# Patient Record
Sex: MALE | Race: WHITE | NOT HISPANIC OR LATINO
[De-identification: names, ages, dates, MRNs, and addresses within clinical notes are randomized per-mention and may not be internally consistent; named-entity substitution may affect disease eponyms.]

---

## 2023-01-01 ENCOUNTER — TRANSCRIPTION ENCOUNTER (OUTPATIENT)
Age: 83
End: 2023-01-01

## 2023-01-01 ENCOUNTER — NON-APPOINTMENT (OUTPATIENT)
Age: 83
End: 2023-01-01

## 2023-01-01 ENCOUNTER — APPOINTMENT (OUTPATIENT)
Dept: NEUROSURGERY | Facility: HOSPITAL | Age: 83
End: 2023-01-01

## 2023-01-01 ENCOUNTER — APPOINTMENT (OUTPATIENT)
Dept: SPINE | Facility: CLINIC | Age: 83
End: 2023-01-01
Payer: COMMERCIAL

## 2023-01-01 ENCOUNTER — INPATIENT (INPATIENT)
Facility: HOSPITAL | Age: 83
LOS: 5 days | Discharge: HOME HEALTH | DRG: 459 | End: 2023-04-02
Attending: NEUROLOGICAL SURGERY | Admitting: NEUROLOGICAL SURGERY
Payer: MEDICARE

## 2023-01-01 ENCOUNTER — APPOINTMENT (OUTPATIENT)
Dept: SPINE | Facility: CLINIC | Age: 83
End: 2023-01-01

## 2023-01-01 ENCOUNTER — APPOINTMENT (OUTPATIENT)
Dept: SPINE | Facility: HOSPITAL | Age: 83
End: 2023-01-01

## 2023-01-01 VITALS
RESPIRATION RATE: 17 BRPM | HEART RATE: 100 BPM | WEIGHT: 214 LBS | DIASTOLIC BLOOD PRESSURE: 68 MMHG | HEIGHT: 62 IN | TEMPERATURE: 97.5 F | BODY MASS INDEX: 39.38 KG/M2 | OXYGEN SATURATION: 99 % | SYSTOLIC BLOOD PRESSURE: 111 MMHG

## 2023-01-01 VITALS
TEMPERATURE: 98 F | DIASTOLIC BLOOD PRESSURE: 75 MMHG | RESPIRATION RATE: 18 BRPM | SYSTOLIC BLOOD PRESSURE: 169 MMHG | HEART RATE: 95 BPM | OXYGEN SATURATION: 98 %

## 2023-01-01 VITALS
SYSTOLIC BLOOD PRESSURE: 131 MMHG | OXYGEN SATURATION: 98 % | RESPIRATION RATE: 16 BRPM | HEART RATE: 79 BPM | DIASTOLIC BLOOD PRESSURE: 67 MMHG | TEMPERATURE: 98 F

## 2023-01-01 DIAGNOSIS — F41.9 ANXIETY DISORDER, UNSPECIFIED: ICD-10-CM

## 2023-01-01 DIAGNOSIS — E03.9 HYPOTHYROIDISM, UNSPECIFIED: ICD-10-CM

## 2023-01-01 DIAGNOSIS — M10.9 GOUT, UNSPECIFIED: ICD-10-CM

## 2023-01-01 DIAGNOSIS — Z98.890 OTHER SPECIFIED POSTPROCEDURAL STATES: Chronic | ICD-10-CM

## 2023-01-01 DIAGNOSIS — E22.2 SYNDROME OF INAPPROPRIATE SECRETION OF ANTIDIURETIC HORMONE: ICD-10-CM

## 2023-01-01 DIAGNOSIS — Z98.890 OTHER SPECIFIED POSTPROCEDURAL STATES: ICD-10-CM

## 2023-01-01 DIAGNOSIS — F32.A ANXIETY DISORDER, UNSPECIFIED: ICD-10-CM

## 2023-01-01 DIAGNOSIS — Z09 ENCOUNTER FOR FOLLOW-UP EXAMINATION AFTER COMPLETED TREATMENT FOR CONDITIONS OTHER THAN MALIGNANT NEOPLASM: ICD-10-CM

## 2023-01-01 DIAGNOSIS — M48.04 SPINAL STENOSIS, THORACIC REGION: ICD-10-CM

## 2023-01-01 DIAGNOSIS — E87.1 HYPO-OSMOLALITY AND HYPONATREMIA: ICD-10-CM

## 2023-01-01 DIAGNOSIS — Z86.39 PERSONAL HISTORY OF OTHER ENDOCRINE, NUTRITIONAL AND METABOLIC DISEASE: ICD-10-CM

## 2023-01-01 DIAGNOSIS — Z82.49 FAMILY HISTORY OF ISCHEMIC HEART DISEASE AND OTHER DISEASES OF THE CIRCULATORY SYSTEM: ICD-10-CM

## 2023-01-01 DIAGNOSIS — Z51.89 ENCOUNTER FOR OTHER SPECIFIED AFTERCARE: ICD-10-CM

## 2023-01-01 DIAGNOSIS — E78.5 HYPERLIPIDEMIA, UNSPECIFIED: ICD-10-CM

## 2023-01-01 DIAGNOSIS — I35.0 NONRHEUMATIC AORTIC (VALVE) STENOSIS: ICD-10-CM

## 2023-01-01 DIAGNOSIS — Z79.84 LONG TERM (CURRENT) USE OF ORAL HYPOGLYCEMIC DRUGS: ICD-10-CM

## 2023-01-01 DIAGNOSIS — E11.9 TYPE 2 DIABETES MELLITUS WITHOUT COMPLICATIONS: ICD-10-CM

## 2023-01-01 DIAGNOSIS — Z91.013 ALLERGY TO SEAFOOD: ICD-10-CM

## 2023-01-01 DIAGNOSIS — R33.9 RETENTION OF URINE, UNSPECIFIED: ICD-10-CM

## 2023-01-01 DIAGNOSIS — I50.33 ACUTE ON CHRONIC DIASTOLIC (CONGESTIVE) HEART FAILURE: ICD-10-CM

## 2023-01-01 DIAGNOSIS — Z88.8 ALLERGY STATUS TO OTHER DRUGS, MEDICAMENTS AND BIOLOGICAL SUBSTANCES: ICD-10-CM

## 2023-01-01 DIAGNOSIS — Z01.818 ENCOUNTER FOR OTHER PREPROCEDURAL EXAMINATION: ICD-10-CM

## 2023-01-01 DIAGNOSIS — M47.14 OTHER SPONDYLOSIS WITH MYELOPATHY, THORACIC REGION: ICD-10-CM

## 2023-01-01 DIAGNOSIS — I11.0 HYPERTENSIVE HEART DISEASE WITH HEART FAILURE: ICD-10-CM

## 2023-01-01 DIAGNOSIS — Z87.39 PERSONAL HISTORY OF OTHER DISEASES OF THE MUSCULOSKELETAL SYSTEM AND CONNECTIVE TISSUE: ICD-10-CM

## 2023-01-01 DIAGNOSIS — I34.0 NONRHEUMATIC MITRAL (VALVE) INSUFFICIENCY: ICD-10-CM

## 2023-01-01 DIAGNOSIS — R29.898 OTHER SYMPTOMS AND SIGNS INVOLVING THE MUSCULOSKELETAL SYSTEM: ICD-10-CM

## 2023-01-01 DIAGNOSIS — I10 ESSENTIAL (PRIMARY) HYPERTENSION: ICD-10-CM

## 2023-01-01 LAB
A1C WITH ESTIMATED AVERAGE GLUCOSE RESULT: 6 % — HIGH (ref 4–5.6)
A1C WITH ESTIMATED AVERAGE GLUCOSE RESULT: 6.3 % — HIGH (ref 4–5.6)
ALBUMIN SERPL ELPH-MCNC: 3.5 G/DL — SIGNIFICANT CHANGE UP (ref 3.3–5)
ALP SERPL-CCNC: 99 U/L — SIGNIFICANT CHANGE UP (ref 40–120)
ALT FLD-CCNC: 20 U/L — SIGNIFICANT CHANGE UP (ref 10–45)
ANION GAP SERPL CALC-SCNC: 10 MMOL/L — SIGNIFICANT CHANGE UP (ref 5–17)
ANION GAP SERPL CALC-SCNC: 10 MMOL/L — SIGNIFICANT CHANGE UP (ref 5–17)
ANION GAP SERPL CALC-SCNC: 12 MMOL/L — SIGNIFICANT CHANGE UP (ref 5–17)
ANION GAP SERPL CALC-SCNC: 13 MMOL/L — SIGNIFICANT CHANGE UP (ref 5–17)
ANION GAP SERPL CALC-SCNC: 15 MMOL/L — SIGNIFICANT CHANGE UP (ref 5–17)
ANION GAP SERPL CALC-SCNC: 8 MMOL/L — SIGNIFICANT CHANGE UP (ref 5–17)
APTT BLD: 29.8 SEC — SIGNIFICANT CHANGE UP (ref 27.5–35.5)
APTT BLD: 33.4 SEC — SIGNIFICANT CHANGE UP (ref 27.5–35.5)
AST SERPL-CCNC: 28 U/L — SIGNIFICANT CHANGE UP (ref 10–40)
BASOPHILS # BLD AUTO: 0.02 K/UL — SIGNIFICANT CHANGE UP (ref 0–0.2)
BASOPHILS # BLD AUTO: 0.05 K/UL — SIGNIFICANT CHANGE UP (ref 0–0.2)
BASOPHILS NFR BLD AUTO: 0.2 % — SIGNIFICANT CHANGE UP (ref 0–2)
BASOPHILS NFR BLD AUTO: 0.6 % — SIGNIFICANT CHANGE UP (ref 0–2)
BILIRUB SERPL-MCNC: 0.2 MG/DL — SIGNIFICANT CHANGE UP (ref 0.2–1.2)
BLD GP AB SCN SERPL QL: NEGATIVE — SIGNIFICANT CHANGE UP
BLD GP AB SCN SERPL QL: NEGATIVE — SIGNIFICANT CHANGE UP
BUN SERPL-MCNC: 21 MG/DL — SIGNIFICANT CHANGE UP (ref 7–23)
BUN SERPL-MCNC: 23 MG/DL — SIGNIFICANT CHANGE UP (ref 7–23)
BUN SERPL-MCNC: 25 MG/DL — HIGH (ref 7–23)
BUN SERPL-MCNC: 25 MG/DL — HIGH (ref 7–23)
BUN SERPL-MCNC: 27 MG/DL — HIGH (ref 7–23)
BUN SERPL-MCNC: 28 MG/DL — HIGH (ref 7–23)
CALCIUM SERPL-MCNC: 8.1 MG/DL — LOW (ref 8.4–10.5)
CALCIUM SERPL-MCNC: 8.2 MG/DL — LOW (ref 8.4–10.5)
CALCIUM SERPL-MCNC: 8.4 MG/DL — SIGNIFICANT CHANGE UP (ref 8.4–10.5)
CALCIUM SERPL-MCNC: 8.7 MG/DL — SIGNIFICANT CHANGE UP (ref 8.4–10.5)
CALCIUM SERPL-MCNC: 8.8 MG/DL — SIGNIFICANT CHANGE UP (ref 8.4–10.5)
CALCIUM SERPL-MCNC: 9.1 MG/DL — SIGNIFICANT CHANGE UP (ref 8.4–10.5)
CALCIUM SERPL-MCNC: 9.3 MG/DL — SIGNIFICANT CHANGE UP (ref 8.4–10.5)
CALCIUM SERPL-MCNC: 9.5 MG/DL — SIGNIFICANT CHANGE UP (ref 8.4–10.5)
CHLORIDE SERPL-SCNC: 102 MMOL/L — SIGNIFICANT CHANGE UP (ref 96–108)
CHLORIDE SERPL-SCNC: 102 MMOL/L — SIGNIFICANT CHANGE UP (ref 96–108)
CHLORIDE SERPL-SCNC: 104 MMOL/L — SIGNIFICANT CHANGE UP (ref 96–108)
CHLORIDE SERPL-SCNC: 104 MMOL/L — SIGNIFICANT CHANGE UP (ref 96–108)
CHLORIDE SERPL-SCNC: 105 MMOL/L — SIGNIFICANT CHANGE UP (ref 96–108)
CHLORIDE SERPL-SCNC: 105 MMOL/L — SIGNIFICANT CHANGE UP (ref 96–108)
CHLORIDE SERPL-SCNC: 106 MMOL/L — SIGNIFICANT CHANGE UP (ref 96–108)
CHLORIDE SERPL-SCNC: 97 MMOL/L — SIGNIFICANT CHANGE UP (ref 96–108)
CO2 SERPL-SCNC: 21 MMOL/L — LOW (ref 22–31)
CO2 SERPL-SCNC: 22 MMOL/L — SIGNIFICANT CHANGE UP (ref 22–31)
CO2 SERPL-SCNC: 23 MMOL/L — SIGNIFICANT CHANGE UP (ref 22–31)
CO2 SERPL-SCNC: 24 MMOL/L — SIGNIFICANT CHANGE UP (ref 22–31)
CO2 SERPL-SCNC: 26 MMOL/L — SIGNIFICANT CHANGE UP (ref 22–31)
CREAT SERPL-MCNC: 0.88 MG/DL — SIGNIFICANT CHANGE UP (ref 0.5–1.3)
CREAT SERPL-MCNC: 0.97 MG/DL — SIGNIFICANT CHANGE UP (ref 0.5–1.3)
CREAT SERPL-MCNC: 0.98 MG/DL — SIGNIFICANT CHANGE UP (ref 0.5–1.3)
CREAT SERPL-MCNC: 1.09 MG/DL — SIGNIFICANT CHANGE UP (ref 0.5–1.3)
CREAT SERPL-MCNC: 1.1 MG/DL — SIGNIFICANT CHANGE UP (ref 0.5–1.3)
CREAT SERPL-MCNC: 1.13 MG/DL — SIGNIFICANT CHANGE UP (ref 0.5–1.3)
CREAT SERPL-MCNC: 1.16 MG/DL — SIGNIFICANT CHANGE UP (ref 0.5–1.3)
CREAT SERPL-MCNC: 1.26 MG/DL — SIGNIFICANT CHANGE UP (ref 0.5–1.3)
EGFR: 57 ML/MIN/1.73M2 — LOW
EGFR: 62 ML/MIN/1.73M2 — SIGNIFICANT CHANGE UP
EGFR: 64 ML/MIN/1.73M2 — SIGNIFICANT CHANGE UP
EGFR: 67 ML/MIN/1.73M2 — SIGNIFICANT CHANGE UP
EGFR: 67 ML/MIN/1.73M2 — SIGNIFICANT CHANGE UP
EGFR: 77 ML/MIN/1.73M2 — SIGNIFICANT CHANGE UP
EGFR: 77 ML/MIN/1.73M2 — SIGNIFICANT CHANGE UP
EGFR: 85 ML/MIN/1.73M2 — SIGNIFICANT CHANGE UP
EOSINOPHIL # BLD AUTO: 0 K/UL — SIGNIFICANT CHANGE UP (ref 0–0.5)
EOSINOPHIL # BLD AUTO: 0.07 K/UL — SIGNIFICANT CHANGE UP (ref 0–0.5)
EOSINOPHIL NFR BLD AUTO: 0 % — SIGNIFICANT CHANGE UP (ref 0–6)
EOSINOPHIL NFR BLD AUTO: 0.8 % — SIGNIFICANT CHANGE UP (ref 0–6)
ESTIMATED AVERAGE GLUCOSE: 126 MG/DL — HIGH (ref 68–114)
ESTIMATED AVERAGE GLUCOSE: 134 MG/DL — HIGH (ref 68–114)
GLUCOSE BLDC GLUCOMTR-MCNC: 106 MG/DL — HIGH (ref 70–99)
GLUCOSE BLDC GLUCOMTR-MCNC: 115 MG/DL — HIGH (ref 70–99)
GLUCOSE BLDC GLUCOMTR-MCNC: 116 MG/DL — HIGH (ref 70–99)
GLUCOSE BLDC GLUCOMTR-MCNC: 120 MG/DL — HIGH (ref 70–99)
GLUCOSE BLDC GLUCOMTR-MCNC: 120 MG/DL — HIGH (ref 70–99)
GLUCOSE BLDC GLUCOMTR-MCNC: 125 MG/DL — HIGH (ref 70–99)
GLUCOSE BLDC GLUCOMTR-MCNC: 128 MG/DL — HIGH (ref 70–99)
GLUCOSE BLDC GLUCOMTR-MCNC: 128 MG/DL — HIGH (ref 70–99)
GLUCOSE BLDC GLUCOMTR-MCNC: 129 MG/DL — HIGH (ref 70–99)
GLUCOSE BLDC GLUCOMTR-MCNC: 148 MG/DL — HIGH (ref 70–99)
GLUCOSE BLDC GLUCOMTR-MCNC: 153 MG/DL — HIGH (ref 70–99)
GLUCOSE BLDC GLUCOMTR-MCNC: 160 MG/DL — HIGH (ref 70–99)
GLUCOSE BLDC GLUCOMTR-MCNC: 164 MG/DL — HIGH (ref 70–99)
GLUCOSE BLDC GLUCOMTR-MCNC: 166 MG/DL — HIGH (ref 70–99)
GLUCOSE BLDC GLUCOMTR-MCNC: 172 MG/DL — HIGH (ref 70–99)
GLUCOSE BLDC GLUCOMTR-MCNC: 173 MG/DL — HIGH (ref 70–99)
GLUCOSE BLDC GLUCOMTR-MCNC: 177 MG/DL — HIGH (ref 70–99)
GLUCOSE BLDC GLUCOMTR-MCNC: 182 MG/DL — HIGH (ref 70–99)
GLUCOSE BLDC GLUCOMTR-MCNC: 183 MG/DL — HIGH (ref 70–99)
GLUCOSE BLDC GLUCOMTR-MCNC: 197 MG/DL — HIGH (ref 70–99)
GLUCOSE BLDC GLUCOMTR-MCNC: 204 MG/DL — HIGH (ref 70–99)
GLUCOSE BLDC GLUCOMTR-MCNC: 216 MG/DL — HIGH (ref 70–99)
GLUCOSE BLDC GLUCOMTR-MCNC: 222 MG/DL — HIGH (ref 70–99)
GLUCOSE BLDC GLUCOMTR-MCNC: 270 MG/DL — HIGH (ref 70–99)
GLUCOSE SERPL-MCNC: 112 MG/DL — HIGH (ref 70–99)
GLUCOSE SERPL-MCNC: 114 MG/DL — HIGH (ref 70–99)
GLUCOSE SERPL-MCNC: 118 MG/DL — HIGH (ref 70–99)
GLUCOSE SERPL-MCNC: 125 MG/DL — HIGH (ref 70–99)
GLUCOSE SERPL-MCNC: 153 MG/DL — HIGH (ref 70–99)
GLUCOSE SERPL-MCNC: 182 MG/DL — HIGH (ref 70–99)
GLUCOSE SERPL-MCNC: 184 MG/DL — HIGH (ref 70–99)
GLUCOSE SERPL-MCNC: 194 MG/DL — HIGH (ref 70–99)
HCT VFR BLD CALC: 31.7 % — LOW (ref 39–50)
HCT VFR BLD CALC: 32.3 % — LOW (ref 39–50)
HCT VFR BLD CALC: 33.5 % — LOW (ref 39–50)
HCT VFR BLD CALC: 34.8 % — LOW (ref 39–50)
HCT VFR BLD CALC: 34.8 % — LOW (ref 39–50)
HCT VFR BLD CALC: 36.9 % — LOW (ref 39–50)
HCT VFR BLD CALC: 37 % — LOW (ref 39–50)
HCT VFR BLD CALC: 38.9 % — LOW (ref 39–50)
HGB BLD-MCNC: 10.2 G/DL — LOW (ref 13–17)
HGB BLD-MCNC: 10.5 G/DL — LOW (ref 13–17)
HGB BLD-MCNC: 10.5 G/DL — LOW (ref 13–17)
HGB BLD-MCNC: 11.1 G/DL — LOW (ref 13–17)
HGB BLD-MCNC: 11.5 G/DL — LOW (ref 13–17)
HGB BLD-MCNC: 11.5 G/DL — LOW (ref 13–17)
HGB BLD-MCNC: 11.9 G/DL — LOW (ref 13–17)
HGB BLD-MCNC: 12.4 G/DL — LOW (ref 13–17)
IMM GRANULOCYTES NFR BLD AUTO: 0.5 % — SIGNIFICANT CHANGE UP (ref 0–0.9)
IMM GRANULOCYTES NFR BLD AUTO: 0.9 % — SIGNIFICANT CHANGE UP (ref 0–0.9)
INR BLD: 1.02 — SIGNIFICANT CHANGE UP (ref 0.88–1.16)
INR BLD: 1.06 — SIGNIFICANT CHANGE UP (ref 0.88–1.16)
LYMPHOCYTES # BLD AUTO: 0.98 K/UL — LOW (ref 1–3.3)
LYMPHOCYTES # BLD AUTO: 1.6 K/UL — SIGNIFICANT CHANGE UP (ref 1–3.3)
LYMPHOCYTES # BLD AUTO: 18.4 % — SIGNIFICANT CHANGE UP (ref 13–44)
LYMPHOCYTES # BLD AUTO: 8.1 % — LOW (ref 13–44)
MAGNESIUM SERPL-MCNC: 1.8 MG/DL — SIGNIFICANT CHANGE UP (ref 1.6–2.6)
MAGNESIUM SERPL-MCNC: 2 MG/DL — SIGNIFICANT CHANGE UP (ref 1.6–2.6)
MAGNESIUM SERPL-MCNC: 2.1 MG/DL — SIGNIFICANT CHANGE UP (ref 1.6–2.6)
MAGNESIUM SERPL-MCNC: 2.3 MG/DL — SIGNIFICANT CHANGE UP (ref 1.6–2.6)
MAGNESIUM SERPL-MCNC: 2.4 MG/DL — SIGNIFICANT CHANGE UP (ref 1.6–2.6)
MCHC RBC-ENTMCNC: 28.6 PG — SIGNIFICANT CHANGE UP (ref 27–34)
MCHC RBC-ENTMCNC: 28.8 PG — SIGNIFICANT CHANGE UP (ref 27–34)
MCHC RBC-ENTMCNC: 29 PG — SIGNIFICANT CHANGE UP (ref 27–34)
MCHC RBC-ENTMCNC: 29.3 PG — SIGNIFICANT CHANGE UP (ref 27–34)
MCHC RBC-ENTMCNC: 29.3 PG — SIGNIFICANT CHANGE UP (ref 27–34)
MCHC RBC-ENTMCNC: 31.1 GM/DL — LOW (ref 32–36)
MCHC RBC-ENTMCNC: 31.3 GM/DL — LOW (ref 32–36)
MCHC RBC-ENTMCNC: 31.9 GM/DL — LOW (ref 32–36)
MCHC RBC-ENTMCNC: 31.9 GM/DL — LOW (ref 32–36)
MCHC RBC-ENTMCNC: 32.2 GM/DL — SIGNIFICANT CHANGE UP (ref 32–36)
MCHC RBC-ENTMCNC: 32.2 GM/DL — SIGNIFICANT CHANGE UP (ref 32–36)
MCHC RBC-ENTMCNC: 32.5 GM/DL — SIGNIFICANT CHANGE UP (ref 32–36)
MCHC RBC-ENTMCNC: 33 GM/DL — SIGNIFICANT CHANGE UP (ref 32–36)
MCV RBC AUTO: 88.8 FL — SIGNIFICANT CHANGE UP (ref 80–100)
MCV RBC AUTO: 89.8 FL — SIGNIFICANT CHANGE UP (ref 80–100)
MCV RBC AUTO: 90.1 FL — SIGNIFICANT CHANGE UP (ref 80–100)
MCV RBC AUTO: 90.2 FL — SIGNIFICANT CHANGE UP (ref 80–100)
MCV RBC AUTO: 90.9 FL — SIGNIFICANT CHANGE UP (ref 80–100)
MCV RBC AUTO: 91.1 FL — SIGNIFICANT CHANGE UP (ref 80–100)
MCV RBC AUTO: 92 FL — SIGNIFICANT CHANGE UP (ref 80–100)
MCV RBC AUTO: 92 FL — SIGNIFICANT CHANGE UP (ref 80–100)
MONOCYTES # BLD AUTO: 0.35 K/UL — SIGNIFICANT CHANGE UP (ref 0–0.9)
MONOCYTES # BLD AUTO: 0.88 K/UL — SIGNIFICANT CHANGE UP (ref 0–0.9)
MONOCYTES NFR BLD AUTO: 4 % — SIGNIFICANT CHANGE UP (ref 2–14)
MONOCYTES NFR BLD AUTO: 7.3 % — SIGNIFICANT CHANGE UP (ref 2–14)
NEUTROPHILS # BLD AUTO: 10.1 K/UL — HIGH (ref 1.8–7.4)
NEUTROPHILS # BLD AUTO: 6.54 K/UL — SIGNIFICANT CHANGE UP (ref 1.8–7.4)
NEUTROPHILS NFR BLD AUTO: 75.3 % — SIGNIFICANT CHANGE UP (ref 43–77)
NEUTROPHILS NFR BLD AUTO: 83.9 % — HIGH (ref 43–77)
NRBC # BLD: 0 /100 WBCS — SIGNIFICANT CHANGE UP (ref 0–0)
NT-PROBNP SERPL-SCNC: 356 PG/ML — HIGH (ref 0–300)
NT-PROBNP SERPL-SCNC: 599 PG/ML — HIGH (ref 0–300)
PHOSPHATE SERPL-MCNC: 2.8 MG/DL — SIGNIFICANT CHANGE UP (ref 2.5–4.5)
PHOSPHATE SERPL-MCNC: 3.1 MG/DL — SIGNIFICANT CHANGE UP (ref 2.5–4.5)
PHOSPHATE SERPL-MCNC: 3.1 MG/DL — SIGNIFICANT CHANGE UP (ref 2.5–4.5)
PHOSPHATE SERPL-MCNC: 3.3 MG/DL — SIGNIFICANT CHANGE UP (ref 2.5–4.5)
PHOSPHATE SERPL-MCNC: 3.4 MG/DL — SIGNIFICANT CHANGE UP (ref 2.5–4.5)
PHOSPHATE SERPL-MCNC: 3.7 MG/DL — SIGNIFICANT CHANGE UP (ref 2.5–4.5)
PHOSPHATE SERPL-MCNC: 4.3 MG/DL — SIGNIFICANT CHANGE UP (ref 2.5–4.5)
PLATELET # BLD AUTO: 400 K/UL — SIGNIFICANT CHANGE UP (ref 150–400)
PLATELET # BLD AUTO: 405 K/UL — HIGH (ref 150–400)
PLATELET # BLD AUTO: 426 K/UL — HIGH (ref 150–400)
PLATELET # BLD AUTO: 451 K/UL — HIGH (ref 150–400)
PLATELET # BLD AUTO: 456 K/UL — HIGH (ref 150–400)
PLATELET # BLD AUTO: 463 K/UL — HIGH (ref 150–400)
PLATELET # BLD AUTO: 463 K/UL — HIGH (ref 150–400)
PLATELET # BLD AUTO: 486 K/UL — HIGH (ref 150–400)
POTASSIUM SERPL-MCNC: 4 MMOL/L — SIGNIFICANT CHANGE UP (ref 3.5–5.3)
POTASSIUM SERPL-MCNC: 4 MMOL/L — SIGNIFICANT CHANGE UP (ref 3.5–5.3)
POTASSIUM SERPL-MCNC: 4.4 MMOL/L — SIGNIFICANT CHANGE UP (ref 3.5–5.3)
POTASSIUM SERPL-MCNC: 4.6 MMOL/L — SIGNIFICANT CHANGE UP (ref 3.5–5.3)
POTASSIUM SERPL-MCNC: 4.7 MMOL/L — SIGNIFICANT CHANGE UP (ref 3.5–5.3)
POTASSIUM SERPL-MCNC: 5.3 MMOL/L — SIGNIFICANT CHANGE UP (ref 3.5–5.3)
POTASSIUM SERPL-SCNC: 4 MMOL/L — SIGNIFICANT CHANGE UP (ref 3.5–5.3)
POTASSIUM SERPL-SCNC: 4 MMOL/L — SIGNIFICANT CHANGE UP (ref 3.5–5.3)
POTASSIUM SERPL-SCNC: 4.4 MMOL/L — SIGNIFICANT CHANGE UP (ref 3.5–5.3)
POTASSIUM SERPL-SCNC: 4.6 MMOL/L — SIGNIFICANT CHANGE UP (ref 3.5–5.3)
POTASSIUM SERPL-SCNC: 4.7 MMOL/L — SIGNIFICANT CHANGE UP (ref 3.5–5.3)
POTASSIUM SERPL-SCNC: 5.3 MMOL/L — SIGNIFICANT CHANGE UP (ref 3.5–5.3)
PROT SERPL-MCNC: 6.6 G/DL — SIGNIFICANT CHANGE UP (ref 6–8.3)
PROTHROM AB SERPL-ACNC: 12.2 SEC — SIGNIFICANT CHANGE UP (ref 10.5–13.4)
PROTHROM AB SERPL-ACNC: 12.6 SEC — SIGNIFICANT CHANGE UP (ref 10.5–13.4)
RBC # BLD: 3.52 M/UL — LOW (ref 4.2–5.8)
RBC # BLD: 3.58 M/UL — LOW (ref 4.2–5.8)
RBC # BLD: 3.64 M/UL — LOW (ref 4.2–5.8)
RBC # BLD: 3.83 M/UL — LOW (ref 4.2–5.8)
RBC # BLD: 3.92 M/UL — LOW (ref 4.2–5.8)
RBC # BLD: 4.02 M/UL — LOW (ref 4.2–5.8)
RBC # BLD: 4.11 M/UL — LOW (ref 4.2–5.8)
RBC # BLD: 4.27 M/UL — SIGNIFICANT CHANGE UP (ref 4.2–5.8)
RBC # FLD: 13.9 % — SIGNIFICANT CHANGE UP (ref 10.3–14.5)
RBC # FLD: 14.2 % — SIGNIFICANT CHANGE UP (ref 10.3–14.5)
RBC # FLD: 14.3 % — SIGNIFICANT CHANGE UP (ref 10.3–14.5)
RBC # FLD: 14.3 % — SIGNIFICANT CHANGE UP (ref 10.3–14.5)
RBC # FLD: 14.4 % — SIGNIFICANT CHANGE UP (ref 10.3–14.5)
RBC # FLD: 14.5 % — SIGNIFICANT CHANGE UP (ref 10.3–14.5)
RH IG SCN BLD-IMP: POSITIVE — SIGNIFICANT CHANGE UP
RH IG SCN BLD-IMP: POSITIVE — SIGNIFICANT CHANGE UP
SARS-COV-2 RNA SPEC QL NAA+PROBE: SIGNIFICANT CHANGE UP
SODIUM SERPL-SCNC: 133 MMOL/L — LOW (ref 135–145)
SODIUM SERPL-SCNC: 136 MMOL/L — SIGNIFICANT CHANGE UP (ref 135–145)
SODIUM SERPL-SCNC: 137 MMOL/L — SIGNIFICANT CHANGE UP (ref 135–145)
SODIUM SERPL-SCNC: 138 MMOL/L — SIGNIFICANT CHANGE UP (ref 135–145)
SODIUM SERPL-SCNC: 138 MMOL/L — SIGNIFICANT CHANGE UP (ref 135–145)
SODIUM SERPL-SCNC: 141 MMOL/L — SIGNIFICANT CHANGE UP (ref 135–145)
WBC # BLD: 12.04 K/UL — HIGH (ref 3.8–10.5)
WBC # BLD: 12.05 K/UL — HIGH (ref 3.8–10.5)
WBC # BLD: 12.11 K/UL — HIGH (ref 3.8–10.5)
WBC # BLD: 7.32 K/UL — SIGNIFICANT CHANGE UP (ref 3.8–10.5)
WBC # BLD: 7.86 K/UL — SIGNIFICANT CHANGE UP (ref 3.8–10.5)
WBC # BLD: 8.28 K/UL — SIGNIFICANT CHANGE UP (ref 3.8–10.5)
WBC # BLD: 8.69 K/UL — SIGNIFICANT CHANGE UP (ref 3.8–10.5)
WBC # BLD: 8.83 K/UL — SIGNIFICANT CHANGE UP (ref 3.8–10.5)
WBC # FLD AUTO: 12.04 K/UL — HIGH (ref 3.8–10.5)
WBC # FLD AUTO: 12.05 K/UL — HIGH (ref 3.8–10.5)
WBC # FLD AUTO: 12.11 K/UL — HIGH (ref 3.8–10.5)
WBC # FLD AUTO: 7.32 K/UL — SIGNIFICANT CHANGE UP (ref 3.8–10.5)
WBC # FLD AUTO: 7.86 K/UL — SIGNIFICANT CHANGE UP (ref 3.8–10.5)
WBC # FLD AUTO: 8.28 K/UL — SIGNIFICANT CHANGE UP (ref 3.8–10.5)
WBC # FLD AUTO: 8.69 K/UL — SIGNIFICANT CHANGE UP (ref 3.8–10.5)
WBC # FLD AUTO: 8.83 K/UL — SIGNIFICANT CHANGE UP (ref 3.8–10.5)

## 2023-01-01 PROCEDURE — 99232 SBSQ HOSP IP/OBS MODERATE 35: CPT | Mod: GC

## 2023-01-01 PROCEDURE — 86900 BLOOD TYPING SEROLOGIC ABO: CPT

## 2023-01-01 PROCEDURE — 97165 OT EVAL LOW COMPLEX 30 MIN: CPT

## 2023-01-01 PROCEDURE — 97530 THERAPEUTIC ACTIVITIES: CPT

## 2023-01-01 PROCEDURE — 82962 GLUCOSE BLOOD TEST: CPT

## 2023-01-01 PROCEDURE — 72070 X-RAY EXAM THORAC SPINE 2VWS: CPT | Mod: 26

## 2023-01-01 PROCEDURE — 83880 ASSAY OF NATRIURETIC PEPTIDE: CPT

## 2023-01-01 PROCEDURE — 22610 ARTHRD PST TQ 1NTRSPC THRC: CPT

## 2023-01-01 PROCEDURE — 85730 THROMBOPLASTIN TIME PARTIAL: CPT

## 2023-01-01 PROCEDURE — U0005: CPT

## 2023-01-01 PROCEDURE — 93970 EXTREMITY STUDY: CPT

## 2023-01-01 PROCEDURE — 99222 1ST HOSP IP/OBS MODERATE 55: CPT

## 2023-01-01 PROCEDURE — 99231 SBSQ HOSP IP/OBS SF/LOW 25: CPT

## 2023-01-01 PROCEDURE — 99024 POSTOP FOLLOW-UP VISIT: CPT

## 2023-01-01 PROCEDURE — 22842 INSERT SPINE FIXATION DEVICE: CPT

## 2023-01-01 PROCEDURE — 93306 TTE W/DOPPLER COMPLETE: CPT

## 2023-01-01 PROCEDURE — 80048 BASIC METABOLIC PNL TOTAL CA: CPT

## 2023-01-01 PROCEDURE — 85610 PROTHROMBIN TIME: CPT

## 2023-01-01 PROCEDURE — 97161 PT EVAL LOW COMPLEX 20 MIN: CPT

## 2023-01-01 PROCEDURE — 93970 EXTREMITY STUDY: CPT | Mod: 26

## 2023-01-01 PROCEDURE — 72070 X-RAY EXAM THORAC SPINE 2VWS: CPT

## 2023-01-01 PROCEDURE — 71045 X-RAY EXAM CHEST 1 VIEW: CPT | Mod: 26

## 2023-01-01 PROCEDURE — 86850 RBC ANTIBODY SCREEN: CPT

## 2023-01-01 PROCEDURE — 84100 ASSAY OF PHOSPHORUS: CPT

## 2023-01-01 PROCEDURE — 63048 LAM FACETEC &FORAMOT EA ADDL: CPT

## 2023-01-01 PROCEDURE — 85027 COMPLETE CBC AUTOMATED: CPT

## 2023-01-01 PROCEDURE — 76000 FLUOROSCOPY <1 HR PHYS/QHP: CPT

## 2023-01-01 PROCEDURE — 36415 COLL VENOUS BLD VENIPUNCTURE: CPT

## 2023-01-01 PROCEDURE — 93306 TTE W/DOPPLER COMPLETE: CPT | Mod: 26

## 2023-01-01 PROCEDURE — 71045 X-RAY EXAM CHEST 1 VIEW: CPT

## 2023-01-01 PROCEDURE — 83036 HEMOGLOBIN GLYCOSYLATED A1C: CPT

## 2023-01-01 PROCEDURE — 83735 ASSAY OF MAGNESIUM: CPT

## 2023-01-01 PROCEDURE — 99232 SBSQ HOSP IP/OBS MODERATE 35: CPT

## 2023-01-01 PROCEDURE — 97116 GAIT TRAINING THERAPY: CPT

## 2023-01-01 PROCEDURE — 63046 LAM FACETEC & FORAMOT THRC: CPT

## 2023-01-01 PROCEDURE — C1889: CPT

## 2023-01-01 PROCEDURE — 85025 COMPLETE CBC W/AUTO DIFF WBC: CPT

## 2023-01-01 PROCEDURE — 99233 SBSQ HOSP IP/OBS HIGH 50: CPT | Mod: GC

## 2023-01-01 PROCEDURE — C1713: CPT

## 2023-01-01 PROCEDURE — U0003: CPT

## 2023-01-01 PROCEDURE — 86901 BLOOD TYPING SEROLOGIC RH(D): CPT

## 2023-01-01 PROCEDURE — 80053 COMPREHEN METABOLIC PANEL: CPT

## 2023-01-01 PROCEDURE — 22614 ARTHRD PST TQ 1NTRSPC EA ADD: CPT

## 2023-01-01 PROCEDURE — 99233 SBSQ HOSP IP/OBS HIGH 50: CPT

## 2023-01-01 DEVICE — IMPLANTABLE DEVICE: Type: IMPLANTABLE DEVICE | Status: FUNCTIONAL

## 2023-01-01 DEVICE — SCREW EVEREST POLY 6.5X40MM: Type: IMPLANTABLE DEVICE | Status: FUNCTIONAL

## 2023-01-01 DEVICE — ROD CONTOURED 5.5X125MM: Type: IMPLANTABLE DEVICE | Status: FUNCTIONAL

## 2023-01-01 DEVICE — SURGIFLO HEMOSTATIC MATRIX KIT: Type: IMPLANTABLE DEVICE | Status: FUNCTIONAL

## 2023-01-01 DEVICE — SURGIFOAM PAD 8CM X 12.5CM X 10MM (100): Type: IMPLANTABLE DEVICE | Status: FUNCTIONAL

## 2023-01-01 DEVICE — SET SCREW EVEREST CUSTOM: Type: IMPLANTABLE DEVICE | Status: FUNCTIONAL

## 2023-01-01 RX ORDER — SODIUM CHLORIDE 9 MG/ML
1000 INJECTION INTRAMUSCULAR; INTRAVENOUS; SUBCUTANEOUS
Refills: 0 | Status: DISCONTINUED | OUTPATIENT
Start: 2023-01-01 | End: 2023-01-01

## 2023-01-01 RX ORDER — LEVOTHYROXINE SODIUM 25 UG/1
25 TABLET ORAL
Refills: 0 | Status: ACTIVE | COMMUNITY

## 2023-01-01 RX ORDER — EZETIMIBE 10 MG/1
1 TABLET ORAL
Refills: 0 | DISCHARGE

## 2023-01-01 RX ORDER — POVIDONE-IODINE 5 %
1 AEROSOL (ML) TOPICAL ONCE
Refills: 0 | Status: COMPLETED | OUTPATIENT
Start: 2023-01-01 | End: 2023-01-01

## 2023-01-01 RX ORDER — ONDANSETRON 8 MG/1
4 TABLET, FILM COATED ORAL EVERY 6 HOURS
Refills: 0 | Status: DISCONTINUED | OUTPATIENT
Start: 2023-01-01 | End: 2023-01-01

## 2023-01-01 RX ORDER — LOSARTAN POTASSIUM 50 MG/1
50 TABLET, FILM COATED ORAL
Refills: 0 | Status: ACTIVE | COMMUNITY

## 2023-01-01 RX ORDER — ALBUTEROL 90 UG/1
2 AEROSOL, METERED ORAL
Refills: 0 | DISCHARGE

## 2023-01-01 RX ORDER — ALLOPURINOL 300 MG/1
300 TABLET ORAL
Refills: 0 | Status: ACTIVE | COMMUNITY

## 2023-01-01 RX ORDER — GABAPENTIN 400 MG/1
1 CAPSULE ORAL
Refills: 0 | DISCHARGE

## 2023-01-01 RX ORDER — GABAPENTIN 400 MG/1
300 CAPSULE ORAL EVERY 8 HOURS
Refills: 0 | Status: DISCONTINUED | OUTPATIENT
Start: 2023-01-01 | End: 2023-01-01

## 2023-01-01 RX ORDER — CEFAZOLIN SODIUM 1 G
2000 VIAL (EA) INJECTION EVERY 8 HOURS
Refills: 0 | Status: COMPLETED | OUTPATIENT
Start: 2023-01-01 | End: 2023-01-01

## 2023-01-01 RX ORDER — ALLOPURINOL 300 MG
1 TABLET ORAL
Refills: 0 | DISCHARGE

## 2023-01-01 RX ORDER — SENNA PLUS 8.6 MG/1
2 TABLET ORAL
Qty: 14 | Refills: 0
Start: 2023-01-01 | End: 2023-01-01

## 2023-01-01 RX ORDER — INSULIN LISPRO 100/ML
VIAL (ML) SUBCUTANEOUS
Refills: 0 | Status: DISCONTINUED | OUTPATIENT
Start: 2023-01-01 | End: 2023-01-01

## 2023-01-01 RX ORDER — GABAPENTIN 400 MG/1
1 CAPSULE ORAL
Qty: 42 | Refills: 0
Start: 2023-01-01 | End: 2023-01-01

## 2023-01-01 RX ORDER — OXYCODONE HYDROCHLORIDE 5 MG/1
5 TABLET ORAL EVERY 4 HOURS
Refills: 0 | Status: DISCONTINUED | OUTPATIENT
Start: 2023-01-01 | End: 2023-01-01

## 2023-01-01 RX ORDER — AMLODIPINE BESYLATE 2.5 MG/1
10 TABLET ORAL DAILY
Refills: 0 | Status: DISCONTINUED | OUTPATIENT
Start: 2023-01-01 | End: 2023-01-01

## 2023-01-01 RX ORDER — ALBUTEROL 90 UG/1
2 AEROSOL, METERED ORAL EVERY 6 HOURS
Refills: 0 | Status: DISCONTINUED | OUTPATIENT
Start: 2023-01-01 | End: 2023-01-01

## 2023-01-01 RX ORDER — LACTULOSE 10 G/15ML
10 SOLUTION ORAL ONCE
Refills: 0 | Status: COMPLETED | OUTPATIENT
Start: 2023-01-01 | End: 2023-01-01

## 2023-01-01 RX ORDER — GLIPIZIDE 5 MG/1
5 TABLET ORAL
Refills: 0 | Status: ACTIVE | COMMUNITY

## 2023-01-01 RX ORDER — SENNA PLUS 8.6 MG/1
2 TABLET ORAL AT BEDTIME
Refills: 0 | Status: DISCONTINUED | OUTPATIENT
Start: 2023-01-01 | End: 2023-01-01

## 2023-01-01 RX ORDER — POLYETHYLENE GLYCOL 3350 17 G/17G
17 POWDER, FOR SOLUTION ORAL DAILY
Refills: 0 | Status: DISCONTINUED | OUTPATIENT
Start: 2023-01-01 | End: 2023-01-01

## 2023-01-01 RX ORDER — GABAPENTIN 400 MG/1
300 CAPSULE ORAL AT BEDTIME
Refills: 0 | Status: DISCONTINUED | OUTPATIENT
Start: 2023-01-01 | End: 2023-01-01

## 2023-01-01 RX ORDER — CHOLECALCIFEROL (VITAMIN D3) 125 MCG
1 CAPSULE ORAL
Refills: 0 | DISCHARGE

## 2023-01-01 RX ORDER — METHOCARBAMOL 500 MG/1
500 TABLET, FILM COATED ORAL EVERY 8 HOURS
Refills: 0 | Status: DISCONTINUED | OUTPATIENT
Start: 2023-01-01 | End: 2023-01-01

## 2023-01-01 RX ORDER — COLCHICINE 0.6 MG
1 TABLET ORAL
Refills: 0 | DISCHARGE

## 2023-01-01 RX ORDER — ATORVASTATIN CALCIUM 80 MG/1
80 TABLET, FILM COATED ORAL AT BEDTIME
Refills: 0 | Status: DISCONTINUED | OUTPATIENT
Start: 2023-01-01 | End: 2023-01-01

## 2023-01-01 RX ORDER — OXYCODONE HYDROCHLORIDE 5 MG/1
5 TABLET ORAL
Refills: 0 | Status: ACTIVE | COMMUNITY

## 2023-01-01 RX ORDER — PAROXETINE HYDROCHLORIDE 30 MG/1
30 TABLET, FILM COATED ORAL
Refills: 0 | Status: ACTIVE | COMMUNITY

## 2023-01-01 RX ORDER — LOSARTAN POTASSIUM 100 MG/1
50 TABLET, FILM COATED ORAL DAILY
Refills: 0 | Status: DISCONTINUED | OUTPATIENT
Start: 2023-01-01 | End: 2023-01-01

## 2023-01-01 RX ORDER — ACETAMINOPHEN 500 MG
650 TABLET ORAL EVERY 6 HOURS
Refills: 0 | Status: DISCONTINUED | OUTPATIENT
Start: 2023-01-01 | End: 2023-01-01

## 2023-01-01 RX ORDER — CHOLECALCIFEROL (VITAMIN D3) 50 MCG
50 MCG TABLET ORAL
Refills: 0 | Status: ACTIVE | COMMUNITY

## 2023-01-01 RX ORDER — ALBUTEROL SULFATE 90 UG/1
108 (90 BASE) AEROSOL, METERED RESPIRATORY (INHALATION)
Refills: 0 | Status: ACTIVE | COMMUNITY

## 2023-01-01 RX ORDER — CELECOXIB 200 MG/1
200 CAPSULE ORAL ONCE
Refills: 0 | Status: COMPLETED | OUTPATIENT
Start: 2023-01-01 | End: 2023-01-01

## 2023-01-01 RX ORDER — AMLODIPINE BESYLATE 10 MG/1
10 TABLET ORAL
Refills: 0 | Status: ACTIVE | COMMUNITY

## 2023-01-01 RX ORDER — DIPHENHYDRAMINE HCL 50 MG
25 CAPSULE ORAL ONCE
Refills: 0 | Status: COMPLETED | OUTPATIENT
Start: 2023-01-01 | End: 2023-01-01

## 2023-01-01 RX ORDER — TAMSULOSIN HYDROCHLORIDE 0.4 MG/1
0.4 CAPSULE ORAL AT BEDTIME
Refills: 0 | Status: DISCONTINUED | OUTPATIENT
Start: 2023-01-01 | End: 2023-01-01

## 2023-01-01 RX ORDER — OXYCODONE HYDROCHLORIDE 5 MG/1
1 TABLET ORAL
Qty: 18 | Refills: 0
Start: 2023-01-01 | End: 2023-01-01

## 2023-01-01 RX ORDER — CHLORHEXIDINE GLUCONATE 213 G/1000ML
1 SOLUTION TOPICAL EVERY 12 HOURS
Refills: 0 | Status: COMPLETED | OUTPATIENT
Start: 2023-01-01 | End: 2023-01-01

## 2023-01-01 RX ORDER — METFORMIN HYDROCHLORIDE 1000 MG/1
1000 TABLET, COATED ORAL
Refills: 0 | Status: ACTIVE | COMMUNITY

## 2023-01-01 RX ORDER — LOSARTAN POTASSIUM 100 MG/1
1 TABLET, FILM COATED ORAL
Refills: 0 | DISCHARGE

## 2023-01-01 RX ORDER — AMLODIPINE BESYLATE 2.5 MG/1
1 TABLET ORAL
Refills: 0 | DISCHARGE

## 2023-01-01 RX ORDER — FUROSEMIDE 40 MG
20 TABLET ORAL ONCE
Refills: 0 | Status: DISCONTINUED | OUTPATIENT
Start: 2023-01-01 | End: 2023-01-01

## 2023-01-01 RX ORDER — TAMSULOSIN HYDROCHLORIDE 0.4 MG/1
1 CAPSULE ORAL
Qty: 30 | Refills: 0
Start: 2023-01-01 | End: 2023-01-01

## 2023-01-01 RX ORDER — METHOCARBAMOL 500 MG/1
500 TABLET, FILM COATED ORAL
Refills: 0 | Status: ACTIVE | COMMUNITY

## 2023-01-01 RX ORDER — DIPHENHYDRAMINE HCL 50 MG
50 CAPSULE ORAL AT BEDTIME
Refills: 0 | Status: DISCONTINUED | OUTPATIENT
Start: 2023-01-01 | End: 2023-01-01

## 2023-01-01 RX ORDER — ENOXAPARIN SODIUM 100 MG/ML
40 INJECTION SUBCUTANEOUS EVERY 24 HOURS
Refills: 0 | Status: DISCONTINUED | OUTPATIENT
Start: 2023-01-01 | End: 2023-01-01

## 2023-01-01 RX ORDER — LEVOTHYROXINE SODIUM 125 MCG
1 TABLET ORAL
Refills: 0 | DISCHARGE

## 2023-01-01 RX ORDER — APREPITANT 80 MG/1
40 CAPSULE ORAL ONCE
Refills: 0 | Status: COMPLETED | OUTPATIENT
Start: 2023-01-01 | End: 2023-01-01

## 2023-01-01 RX ORDER — ACETAMINOPHEN 500 MG
1000 TABLET ORAL EVERY 8 HOURS
Refills: 0 | Status: DISCONTINUED | OUTPATIENT
Start: 2023-01-01 | End: 2023-01-01

## 2023-01-01 RX ORDER — MAGNESIUM SULFATE 500 MG/ML
2 VIAL (ML) INJECTION ONCE
Refills: 0 | Status: COMPLETED | OUTPATIENT
Start: 2023-01-01 | End: 2023-01-01

## 2023-01-01 RX ORDER — TAMSULOSIN HYDROCHLORIDE 0.4 MG/1
0.4 CAPSULE ORAL
Refills: 0 | Status: ACTIVE | COMMUNITY

## 2023-01-01 RX ORDER — METFORMIN HYDROCHLORIDE 850 MG/1
1 TABLET ORAL
Refills: 0 | DISCHARGE

## 2023-01-01 RX ORDER — EZETIMIBE 10 MG/1
10 TABLET ORAL
Refills: 0 | Status: ACTIVE | COMMUNITY

## 2023-01-01 RX ORDER — METHOCARBAMOL 500 MG/1
1 TABLET, FILM COATED ORAL
Qty: 15 | Refills: 0
Start: 2023-01-01 | End: 2023-01-01

## 2023-01-01 RX ORDER — COLCHICINE 0.6 MG/1
0.6 CAPSULE ORAL
Refills: 0 | Status: ACTIVE | COMMUNITY

## 2023-01-01 RX ORDER — GABAPENTIN 300 MG/1
300 CAPSULE ORAL
Refills: 0 | Status: ACTIVE | COMMUNITY

## 2023-01-01 RX ORDER — ACETAMINOPHEN 500 MG
1000 TABLET ORAL ONCE
Refills: 0 | Status: COMPLETED | OUTPATIENT
Start: 2023-01-01 | End: 2023-01-01

## 2023-01-01 RX ORDER — ATORVASTATIN CALCIUM 80 MG/1
1 TABLET, FILM COATED ORAL
Refills: 0 | DISCHARGE

## 2023-01-01 RX ADMIN — GABAPENTIN 300 MILLIGRAM(S): 400 CAPSULE ORAL at 22:31

## 2023-01-01 RX ADMIN — Medication 2: at 12:55

## 2023-01-01 RX ADMIN — SODIUM CHLORIDE 30 MILLILITER(S): 9 INJECTION INTRAMUSCULAR; INTRAVENOUS; SUBCUTANEOUS at 05:49

## 2023-01-01 RX ADMIN — ATORVASTATIN CALCIUM 80 MILLIGRAM(S): 80 TABLET, FILM COATED ORAL at 22:02

## 2023-01-01 RX ADMIN — ATORVASTATIN CALCIUM 80 MILLIGRAM(S): 80 TABLET, FILM COATED ORAL at 21:09

## 2023-01-01 RX ADMIN — METHOCARBAMOL 500 MILLIGRAM(S): 500 TABLET, FILM COATED ORAL at 23:59

## 2023-01-01 RX ADMIN — Medication 25 GRAM(S): at 10:12

## 2023-01-01 RX ADMIN — Medication 50 MILLIGRAM(S): at 22:34

## 2023-01-01 RX ADMIN — OXYCODONE HYDROCHLORIDE 5 MILLIGRAM(S): 5 TABLET ORAL at 06:18

## 2023-01-01 RX ADMIN — AMLODIPINE BESYLATE 10 MILLIGRAM(S): 2.5 TABLET ORAL at 05:47

## 2023-01-01 RX ADMIN — Medication 1000 MILLIGRAM(S): at 05:47

## 2023-01-01 RX ADMIN — SENNA PLUS 2 TABLET(S): 8.6 TABLET ORAL at 22:30

## 2023-01-01 RX ADMIN — Medication 650 MILLIGRAM(S): at 23:54

## 2023-01-01 RX ADMIN — GABAPENTIN 300 MILLIGRAM(S): 400 CAPSULE ORAL at 22:36

## 2023-01-01 RX ADMIN — LOSARTAN POTASSIUM 50 MILLIGRAM(S): 100 TABLET, FILM COATED ORAL at 14:47

## 2023-01-01 RX ADMIN — GABAPENTIN 300 MILLIGRAM(S): 400 CAPSULE ORAL at 05:48

## 2023-01-01 RX ADMIN — Medication 1 TABLET(S): at 12:38

## 2023-01-01 RX ADMIN — Medication 650 MILLIGRAM(S): at 22:08

## 2023-01-01 RX ADMIN — CHLORHEXIDINE GLUCONATE 1 APPLICATION(S): 213 SOLUTION TOPICAL at 09:10

## 2023-01-01 RX ADMIN — Medication 650 MILLIGRAM(S): at 02:01

## 2023-01-01 RX ADMIN — GABAPENTIN 300 MILLIGRAM(S): 400 CAPSULE ORAL at 23:59

## 2023-01-01 RX ADMIN — Medication 2: at 22:53

## 2023-01-01 RX ADMIN — GABAPENTIN 300 MILLIGRAM(S): 400 CAPSULE ORAL at 12:38

## 2023-01-01 RX ADMIN — METHOCARBAMOL 500 MILLIGRAM(S): 500 TABLET, FILM COATED ORAL at 22:02

## 2023-01-01 RX ADMIN — POLYETHYLENE GLYCOL 3350 17 GRAM(S): 17 POWDER, FOR SOLUTION ORAL at 12:02

## 2023-01-01 RX ADMIN — SENNA PLUS 2 TABLET(S): 8.6 TABLET ORAL at 22:35

## 2023-01-01 RX ADMIN — METHOCARBAMOL 500 MILLIGRAM(S): 500 TABLET, FILM COATED ORAL at 14:47

## 2023-01-01 RX ADMIN — Medication 650 MILLIGRAM(S): at 09:26

## 2023-01-01 RX ADMIN — Medication 6: at 22:43

## 2023-01-01 RX ADMIN — Medication 1000 MILLIGRAM(S): at 23:00

## 2023-01-01 RX ADMIN — AMLODIPINE BESYLATE 10 MILLIGRAM(S): 2.5 TABLET ORAL at 06:36

## 2023-01-01 RX ADMIN — LOSARTAN POTASSIUM 50 MILLIGRAM(S): 100 TABLET, FILM COATED ORAL at 06:18

## 2023-01-01 RX ADMIN — GABAPENTIN 300 MILLIGRAM(S): 400 CAPSULE ORAL at 22:01

## 2023-01-01 RX ADMIN — Medication 30 MILLIGRAM(S): at 12:56

## 2023-01-01 RX ADMIN — METHOCARBAMOL 500 MILLIGRAM(S): 500 TABLET, FILM COATED ORAL at 12:56

## 2023-01-01 RX ADMIN — METHOCARBAMOL 500 MILLIGRAM(S): 500 TABLET, FILM COATED ORAL at 21:09

## 2023-01-01 RX ADMIN — Medication 1000 MILLIGRAM(S): at 23:58

## 2023-01-01 RX ADMIN — SENNA PLUS 2 TABLET(S): 8.6 TABLET ORAL at 22:01

## 2023-01-01 RX ADMIN — Medication 2: at 12:02

## 2023-01-01 RX ADMIN — Medication 1000 MILLIGRAM(S): at 13:55

## 2023-01-01 RX ADMIN — Medication 2: at 17:39

## 2023-01-01 RX ADMIN — Medication 1000 MILLIGRAM(S): at 22:43

## 2023-01-01 RX ADMIN — ATORVASTATIN CALCIUM 80 MILLIGRAM(S): 80 TABLET, FILM COATED ORAL at 22:33

## 2023-01-01 RX ADMIN — METHOCARBAMOL 500 MILLIGRAM(S): 500 TABLET, FILM COATED ORAL at 06:18

## 2023-01-01 RX ADMIN — GABAPENTIN 300 MILLIGRAM(S): 400 CAPSULE ORAL at 14:47

## 2023-01-01 RX ADMIN — APREPITANT 40 MILLIGRAM(S): 80 CAPSULE ORAL at 12:00

## 2023-01-01 RX ADMIN — Medication 1000 MILLIGRAM(S): at 12:00

## 2023-01-01 RX ADMIN — Medication 1000 MILLIGRAM(S): at 15:24

## 2023-01-01 RX ADMIN — Medication 650 MILLIGRAM(S): at 21:08

## 2023-01-01 RX ADMIN — GABAPENTIN 300 MILLIGRAM(S): 400 CAPSULE ORAL at 21:09

## 2023-01-01 RX ADMIN — Medication 30 MILLIGRAM(S): at 13:58

## 2023-01-01 RX ADMIN — Medication 1 TABLET(S): at 12:19

## 2023-01-01 RX ADMIN — METHOCARBAMOL 500 MILLIGRAM(S): 500 TABLET, FILM COATED ORAL at 06:27

## 2023-01-01 RX ADMIN — LOSARTAN POTASSIUM 50 MILLIGRAM(S): 100 TABLET, FILM COATED ORAL at 05:24

## 2023-01-01 RX ADMIN — Medication 100 MILLIGRAM(S): at 22:02

## 2023-01-01 RX ADMIN — Medication 30 MILLIGRAM(S): at 12:02

## 2023-01-01 RX ADMIN — Medication 1 TABLET(S): at 12:02

## 2023-01-01 RX ADMIN — GABAPENTIN 300 MILLIGRAM(S): 400 CAPSULE ORAL at 05:25

## 2023-01-01 RX ADMIN — Medication 1000 MILLIGRAM(S): at 06:28

## 2023-01-01 RX ADMIN — ONDANSETRON 4 MILLIGRAM(S): 8 TABLET, FILM COATED ORAL at 06:25

## 2023-01-01 RX ADMIN — AMLODIPINE BESYLATE 10 MILLIGRAM(S): 2.5 TABLET ORAL at 05:25

## 2023-01-01 RX ADMIN — ATORVASTATIN CALCIUM 80 MILLIGRAM(S): 80 TABLET, FILM COATED ORAL at 23:58

## 2023-01-01 RX ADMIN — LOSARTAN POTASSIUM 50 MILLIGRAM(S): 100 TABLET, FILM COATED ORAL at 06:07

## 2023-01-01 RX ADMIN — AMLODIPINE BESYLATE 10 MILLIGRAM(S): 2.5 TABLET ORAL at 06:27

## 2023-01-01 RX ADMIN — Medication 1 TABLET(S): at 12:56

## 2023-01-01 RX ADMIN — AMLODIPINE BESYLATE 10 MILLIGRAM(S): 2.5 TABLET ORAL at 06:18

## 2023-01-01 RX ADMIN — GABAPENTIN 300 MILLIGRAM(S): 400 CAPSULE ORAL at 12:56

## 2023-01-01 RX ADMIN — Medication 50 MILLIGRAM(S): at 22:35

## 2023-01-01 RX ADMIN — Medication 100 MILLIGRAM(S): at 05:48

## 2023-01-01 RX ADMIN — Medication 4: at 06:10

## 2023-01-01 RX ADMIN — TAMSULOSIN HYDROCHLORIDE 0.4 MILLIGRAM(S): 0.4 CAPSULE ORAL at 22:33

## 2023-01-01 RX ADMIN — ONDANSETRON 4 MILLIGRAM(S): 8 TABLET, FILM COATED ORAL at 05:47

## 2023-01-01 RX ADMIN — POLYETHYLENE GLYCOL 3350 17 GRAM(S): 17 POWDER, FOR SOLUTION ORAL at 12:19

## 2023-01-01 RX ADMIN — ENOXAPARIN SODIUM 40 MILLIGRAM(S): 100 INJECTION SUBCUTANEOUS at 19:19

## 2023-01-01 RX ADMIN — LACTULOSE 10 GRAM(S): 10 SOLUTION ORAL at 06:30

## 2023-01-01 RX ADMIN — Medication 1000 MILLIGRAM(S): at 14:47

## 2023-01-01 RX ADMIN — ENOXAPARIN SODIUM 40 MILLIGRAM(S): 100 INJECTION SUBCUTANEOUS at 19:21

## 2023-01-01 RX ADMIN — Medication 1 TABLET(S): at 12:01

## 2023-01-01 RX ADMIN — LOSARTAN POTASSIUM 50 MILLIGRAM(S): 100 TABLET, FILM COATED ORAL at 06:26

## 2023-01-01 RX ADMIN — Medication 30 MILLIGRAM(S): at 20:49

## 2023-01-01 RX ADMIN — AMLODIPINE BESYLATE 10 MILLIGRAM(S): 2.5 TABLET ORAL at 06:07

## 2023-01-01 RX ADMIN — OXYCODONE HYDROCHLORIDE 5 MILLIGRAM(S): 5 TABLET ORAL at 07:18

## 2023-01-01 RX ADMIN — OXYCODONE HYDROCHLORIDE 5 MILLIGRAM(S): 5 TABLET ORAL at 09:50

## 2023-01-01 RX ADMIN — Medication 1000 MILLIGRAM(S): at 22:02

## 2023-01-01 RX ADMIN — GABAPENTIN 300 MILLIGRAM(S): 400 CAPSULE ORAL at 06:26

## 2023-01-01 RX ADMIN — Medication 650 MILLIGRAM(S): at 10:26

## 2023-01-01 RX ADMIN — METHOCARBAMOL 500 MILLIGRAM(S): 500 TABLET, FILM COATED ORAL at 05:47

## 2023-01-01 RX ADMIN — OXYCODONE HYDROCHLORIDE 5 MILLIGRAM(S): 5 TABLET ORAL at 10:50

## 2023-01-01 RX ADMIN — Medication 1000 MILLIGRAM(S): at 06:18

## 2023-01-01 RX ADMIN — METHOCARBAMOL 500 MILLIGRAM(S): 500 TABLET, FILM COATED ORAL at 14:24

## 2023-01-01 RX ADMIN — Medication 2: at 11:33

## 2023-01-01 RX ADMIN — GABAPENTIN 300 MILLIGRAM(S): 400 CAPSULE ORAL at 06:18

## 2023-01-01 RX ADMIN — OXYCODONE HYDROCHLORIDE 5 MILLIGRAM(S): 5 TABLET ORAL at 19:24

## 2023-01-01 RX ADMIN — POLYETHYLENE GLYCOL 3350 17 GRAM(S): 17 POWDER, FOR SOLUTION ORAL at 12:38

## 2023-01-01 RX ADMIN — Medication 25 MILLIGRAM(S): at 00:48

## 2023-01-01 RX ADMIN — ONDANSETRON 4 MILLIGRAM(S): 8 TABLET, FILM COATED ORAL at 23:44

## 2023-01-01 RX ADMIN — Medication 2: at 22:00

## 2023-01-01 RX ADMIN — Medication 1000 MILLIGRAM(S): at 22:30

## 2023-01-01 RX ADMIN — Medication 2: at 00:17

## 2023-01-01 RX ADMIN — CHLORHEXIDINE GLUCONATE 1 APPLICATION(S): 213 SOLUTION TOPICAL at 17:09

## 2023-01-01 RX ADMIN — Medication 1000 MILLIGRAM(S): at 22:58

## 2023-01-01 RX ADMIN — Medication 4: at 16:58

## 2023-01-01 RX ADMIN — Medication 650 MILLIGRAM(S): at 03:01

## 2023-01-01 RX ADMIN — Medication 1000 MILLIGRAM(S): at 12:55

## 2023-01-01 RX ADMIN — Medication 50 MILLIGRAM(S): at 22:30

## 2023-01-01 RX ADMIN — GABAPENTIN 300 MILLIGRAM(S): 400 CAPSULE ORAL at 14:24

## 2023-01-01 RX ADMIN — POLYETHYLENE GLYCOL 3350 17 GRAM(S): 17 POWDER, FOR SOLUTION ORAL at 12:55

## 2023-01-01 RX ADMIN — GABAPENTIN 300 MILLIGRAM(S): 400 CAPSULE ORAL at 06:35

## 2023-01-01 RX ADMIN — METHOCARBAMOL 500 MILLIGRAM(S): 500 TABLET, FILM COATED ORAL at 22:31

## 2023-01-01 RX ADMIN — Medication 1000 MILLIGRAM(S): at 06:24

## 2023-01-01 RX ADMIN — Medication 4: at 19:53

## 2023-01-01 RX ADMIN — METHOCARBAMOL 500 MILLIGRAM(S): 500 TABLET, FILM COATED ORAL at 23:54

## 2023-01-01 RX ADMIN — SODIUM CHLORIDE 30 MILLILITER(S): 9 INJECTION INTRAMUSCULAR; INTRAVENOUS; SUBCUTANEOUS at 00:00

## 2023-01-01 RX ADMIN — CELECOXIB 200 MILLIGRAM(S): 200 CAPSULE ORAL at 12:00

## 2023-01-01 RX ADMIN — GABAPENTIN 300 MILLIGRAM(S): 400 CAPSULE ORAL at 13:40

## 2023-01-01 RX ADMIN — Medication 1000 MILLIGRAM(S): at 14:24

## 2023-01-01 RX ADMIN — OXYCODONE HYDROCHLORIDE 5 MILLIGRAM(S): 5 TABLET ORAL at 20:24

## 2023-01-01 RX ADMIN — Medication 1000 MILLIGRAM(S): at 08:32

## 2023-01-01 RX ADMIN — Medication 1 APPLICATION(S): at 12:02

## 2023-01-01 RX ADMIN — ATORVASTATIN CALCIUM 80 MILLIGRAM(S): 80 TABLET, FILM COATED ORAL at 22:36

## 2023-01-01 RX ADMIN — Medication 650 MILLIGRAM(S): at 00:54

## 2023-01-01 RX ADMIN — Medication 30 MILLIGRAM(S): at 12:19

## 2023-03-27 NOTE — PATIENT PROFILE ADULT - FUNCTIONAL ASSESSMENT - BASIC MOBILITY 6.
2-calculated by average/Not able to assess (calculate score using [facility] averaging method)

## 2023-03-27 NOTE — PATIENT PROFILE ADULT - FALL HARM RISK - HARM RISK INTERVENTIONS
Assistance with ambulation/Assistance OOB with selected safe patient handling equipment/Communicate Risk of Fall with Harm to all staff/Discuss with provider need for PT consult/Monitor gait and stability/Reinforce activity limits and safety measures with patient and family/Tailored Fall Risk Interventions/Visual Cue: Yellow wristband and red socks/Bed in lowest position, wheels locked, appropriate side rails in place/Call bell, personal items and telephone in reach/Instruct patient to call for assistance before getting out of bed or chair/Non-slip footwear when patient is out of bed/[place] to call system/Physically safe environment - no spills, clutter or unnecessary equipment/Purposeful Proactive Rounding/Room/bathroom lighting operational, light cord in reach

## 2023-03-28 NOTE — H&P ADULT - HISTORY OF PRESENT ILLNESS
83 y/o male with h/o DM, HTN, HLD, Hypothyroidism, gout, and anxiety presents transferred with CT findings of multilevel thoracic canal stenosis in the setting of LE weakness and bowel/bladder symptoms. Presented to [place] on 2/25 after neurologist, Dr. Dailey recommended CT demonstrating concern for T10 compression. Presents with complaints of progressive worsening of b/l LE weakness and ambulatory disfunction x 3 months (previously ambulatory then requiring a cane, walker, and now unable to ambulate). Describes a tingling sensation in his toes but no loss of sensation in the lower extremities. Also reports dysuria, urinary retention, and saddle anesthesia over the last 2 weeks with intermittent bowel and bladder incontinence. Denies headache, chest pain, palpitations, visual abnormalities, nausea/vomiting, fever, chills, weight loss/gain. Transfered for further management and surgical plan.

## 2023-03-28 NOTE — H&P ADULT - NSHPSOCIALHISTORY_GEN_ALL_CORE
Patient lives with his wife at home in [place], [place]. Retired .  of [employer]. Denies tobacco, alcohol, ilicit drug use.

## 2023-03-28 NOTE — CONSULT NOTE ADULT - SUBJECTIVE AND OBJECTIVE BOX
NEUROSURGERY PAIN MANAGEMENT CONSULT NOTE    Chief Complaint: b/l lower extremity weakness    HPI:  83 y/o male with h/o DM, HTN, HLD, Hypothyroidism, gout, and anxiety presents transferred with CT findings of multilevel thoracic canal stenosis in the setting of LE weakness and bowel/bladder symptoms. Presented to [place] on 2/25 after neurologist, Dr. Dailey recommended CT demonstrating concern for T10 compression. Presents with complaints of progressive worsening of b/l LE weakness and ambulatory disfunction x 3 months (previously ambulatory then requiring a cane, walker, and now unable to ambulate). Describes a tingling sensation in his toes but no loss of sensation in the lower extremities. Also reports dysuria, urinary retention, and saddle anesthesia over the last 2 weeks with intermittent bowel and bladder incontinence. Denies headache, chest pain, palpitations, visual abnormalities, nausea/vomiting, fever, chills, weight loss/gain. Transfered for further management and surgical plan.  (28 Mar 2023 01:04)      PAST MEDICAL & SURGICAL HISTORY:  HTN (hypertension)      HLD (hyperlipidemia)      Gout      Anxiety      DM (diabetes mellitus)      Thoracic spinal stenosis      HTN (hypertension)      HTN (hypertension)      History of carpal tunnel surgery of left wrist      History of prostate surgery          FAMILY HISTORY:  Family history of stroke (Father)    Family history of heart attack (Mother)        SOCIAL HISTORY:  [ ] Denies Smoking, Alcohol, or Drug Use    HOME MEDICATIONS:   Please refer to initial HNP    PAIN HOME MEDICATIONS:    Allergies    niacin (Rash (Mild))  Shrimp (Anaphylaxis)    Intolerances        PAIN MEDICATIONS:  acetaminophen     Tablet .. 650 milliGRAM(s) Oral every 6 hours PRN  gabapentin 300 milliGRAM(s) Oral every 8 hours  methocarbamol 500 milliGRAM(s) Oral every 8 hours PRN  ondansetron Injectable 4 milliGRAM(s) IV Push every 6 hours PRN  PARoxetine 30 milliGRAM(s) Oral daily    Heme:    Antibiotics:    Cardiovascular:  amLODIPine   Tablet 10 milliGRAM(s) Oral daily  losartan 50 milliGRAM(s) Oral daily    GI:  polyethylene glycol 3350 17 Gram(s) Oral daily  senna 2 Tablet(s) Oral at bedtime    Endocrine:  atorvastatin 80 milliGRAM(s) Oral at bedtime  insulin lispro (ADMELOG) corrective regimen sliding scale   SubCutaneous Before meals and at bedtime    All Other Medications:  multivitamin 1 Tablet(s) Oral daily      Vital Signs Last 24 Hrs  T(C): 36.4 (28 Mar 2023 08:43), Max: 36.8 (27 Mar 2023 23:36)  T(F): 97.6 (28 Mar 2023 08:43), Max: 98.2 (27 Mar 2023 23:36)  HR: 69 (28 Mar 2023 08:43) (69 - 95)  BP: 136/72 (28 Mar 2023 08:43) (136/72 - 169/75)  BP(mean): 94 (28 Mar 2023 08:43) (94 - 94)  RR: 16 (28 Mar 2023 08:43) (16 - 18)  SpO2: 96% (28 Mar 2023 08:43) (96% - 99%)    Parameters below as of 28 Mar 2023 08:43  Patient On (Oxygen Delivery Method): room air        LABS:                        11.9   7.86  )-----------( 486      ( 28 Mar 2023 05:30 )             36.9     03-28    138  |  102  |  23  ----------------------------<  114<H>  4.0   |  24  |  1.09    Ca    9.3      28 Mar 2023 05:30  Phos  3.3     03-28  Mg     2.3     03-28    TPro  6.6  /  Alb  3.5  /  TBili  0.2  /  DBili  x   /  AST  28  /  ALT  20  /  AlkPhos  99  03-28    PT/INR - ( 28 Mar 2023 01:05 )   PT: 12.6 sec;   INR: 1.06          PTT - ( 28 Mar 2023 01:05 )  PTT:29.8 sec      RADIOLOGY:    Drug Screen:        REVIEW OF SYSTEMS:  CONSTITUTIONAL: No fever or fatigue O/N.   EYES: No eye pain, visual disturbances  ENMT:  No difficulty hearing. No throat pain  NECK: No pain or stiffness  RESPIRATORY: No cough, wheezing; No shortness of breath  CARDIOVASCULAR: No chest pain, palpitations.   GASTROINTESTINAL: Pt reports passing gas. No bowel movements. No abdominal or epigastric pain. No nausea, vomiting. GENITOURINARY: No dysuria, frequency, or incontinence  NEUROLOGICAL: No headaches, loss of strength, numbness, or tremors. No dizzinesss or lightheadedness with pain medications.   MUSCULOSKELETAL: Incisional back pain. No joint pain or swelling; No muscle, or extremity pain    PAIN ASSESSMENT: c/o right hip/low back pain radiating to R groin     PHYSICAL EXAM  GENERAL: Laying in bed, NAD  Neuro: CN II-XII PERRRL, EOMI  Cranial nerves grossly intact  Motor exam: MAEx4  Sensation intact to LT in UE/LE in 3 dermatomes  CHEST/LUNG: Clear to auscultation bilaterally; No rales, rhonchi, wheezing, or rubs  HEART: Regular rate and rhythm; No murmurs, rubs, or gallops  ABDOMEN: Soft, Nontender, Nondistended; Bowel sounds present  EXTREMITIES:  2+ Peripheral Pulses, No clubbing, cyanosis, or edema  SKIN: No rashes or lesions      ASSESSMENT:   83 y/o male with h/o DM, HTN, HLD, gout, and anxiety presents transfered with CT findings of multilevel thoracic canal stensis in the setting of LE weakness and bowel/bladder symptoms. Transfered for further management and surgical plan.     PLAN:   - Pain:      - Home pain regiment: Gabapentin 300mg at bedtime, Tylenol as needed    - Bowel regimen: Senna    - Nausea ppx: Zofran standing  - Functional Goals: Pt will get OOB with PT today. Pt will resume previous level of activity without impairment from surgery.   - Additional Consults: None recommended.   - Additional Labs/Imaging:  None recommended.   - Follow up, Discharge Planning: pending post op PT/OT  - Pain Management follow up plan: will continue to follow    D/w Dr. Yang   NEUROSURGERY PAIN MANAGEMENT CONSULT NOTE    Chief Complaint: b/l lower extremity weakness    HPI:  81 y/o male with h/o DM, HTN, HLD, Hypothyroidism, gout, and anxiety presents transferred with CT findings of multilevel thoracic canal stenosis in the setting of LE weakness and bowel/bladder symptoms. Presented to [place] on 2/25 after neurologist, Dr. Dailey recommended CT demonstrating concern for T10 compression. Presents with complaints of progressive worsening of b/l LE weakness and ambulatory disfunction x 3 months (previously ambulatory then requiring a cane, walker, and now unable to ambulate). Describes a tingling sensation in his toes but no loss of sensation in the lower extremities. Also reports dysuria, urinary retention, and saddle anesthesia over the last 2 weeks with intermittent bowel and bladder incontinence. Denies headache, chest pain, palpitations, visual abnormalities, nausea/vomiting, fever, chills, weight loss/gain. Transfered for further management and surgical plan.  (28 Mar 2023 01:04)      PAST MEDICAL & SURGICAL HISTORY:  HTN (hypertension)      HLD (hyperlipidemia)      Gout      Anxiety      DM (diabetes mellitus)      Thoracic spinal stenosis      HTN (hypertension)      HTN (hypertension)      History of carpal tunnel surgery of left wrist      History of prostate surgery          FAMILY HISTORY:  Family history of stroke (Father)    Family history of heart attack (Mother)        SOCIAL HISTORY:  [ ] Denies Smoking, Alcohol, or Drug Use    HOME MEDICATIONS:   Please refer to initial HNP    PAIN HOME MEDICATIONS:    Allergies    niacin (Rash (Mild))  Shrimp (Anaphylaxis)    Intolerances        PAIN MEDICATIONS:  acetaminophen     Tablet .. 650 milliGRAM(s) Oral every 6 hours PRN  gabapentin 300 milliGRAM(s) Oral every 8 hours  methocarbamol 500 milliGRAM(s) Oral every 8 hours PRN  ondansetron Injectable 4 milliGRAM(s) IV Push every 6 hours PRN  PARoxetine 30 milliGRAM(s) Oral daily    Heme:    Antibiotics:    Cardiovascular:  amLODIPine   Tablet 10 milliGRAM(s) Oral daily  losartan 50 milliGRAM(s) Oral daily    GI:  polyethylene glycol 3350 17 Gram(s) Oral daily  senna 2 Tablet(s) Oral at bedtime    Endocrine:  atorvastatin 80 milliGRAM(s) Oral at bedtime  insulin lispro (ADMELOG) corrective regimen sliding scale   SubCutaneous Before meals and at bedtime    All Other Medications:  multivitamin 1 Tablet(s) Oral daily      Vital Signs Last 24 Hrs  T(C): 36.4 (28 Mar 2023 08:43), Max: 36.8 (27 Mar 2023 23:36)  T(F): 97.6 (28 Mar 2023 08:43), Max: 98.2 (27 Mar 2023 23:36)  HR: 69 (28 Mar 2023 08:43) (69 - 95)  BP: 136/72 (28 Mar 2023 08:43) (136/72 - 169/75)  BP(mean): 94 (28 Mar 2023 08:43) (94 - 94)  RR: 16 (28 Mar 2023 08:43) (16 - 18)  SpO2: 96% (28 Mar 2023 08:43) (96% - 99%)    Parameters below as of 28 Mar 2023 08:43  Patient On (Oxygen Delivery Method): room air        LABS:                        11.9   7.86  )-----------( 486      ( 28 Mar 2023 05:30 )             36.9     03-28    138  |  102  |  23  ----------------------------<  114<H>  4.0   |  24  |  1.09    Ca    9.3      28 Mar 2023 05:30  Phos  3.3     03-28  Mg     2.3     03-28    TPro  6.6  /  Alb  3.5  /  TBili  0.2  /  DBili  x   /  AST  28  /  ALT  20  /  AlkPhos  99  03-28    PT/INR - ( 28 Mar 2023 01:05 )   PT: 12.6 sec;   INR: 1.06          PTT - ( 28 Mar 2023 01:05 )  PTT:29.8 sec      RADIOLOGY:    Drug Screen:        REVIEW OF SYSTEMS:  CONSTITUTIONAL: No fever or fatigue O/N.   EYES: No eye pain, visual disturbances  ENMT:  No difficulty hearing. No throat pain  NECK: No pain or stiffness  RESPIRATORY: No cough, wheezing; No shortness of breath  CARDIOVASCULAR: No chest pain, palpitations.   GASTROINTESTINAL: Pt reports passing gas. No bowel movements. No abdominal or epigastric pain. No nausea, vomiting. GENITOURINARY: No dysuria, frequency, or incontinence  NEUROLOGICAL: No headaches, loss of strength, numbness, or tremors. No dizzinesss or lightheadedness with pain medications.   MUSCULOSKELETAL: Incisional back pain. No joint pain or swelling; No muscle, or extremity pain    PAIN ASSESSMENT: c/o right hip/low back pain radiating to R groin     PHYSICAL EXAM  GENERAL: Laying in bed, NAD  Neuro: CN II-XII PERRL, EOMI  Cranial nerves grossly intact  Motor exam: MAEx4  Sensation intact to LT in UE/LE in 3 dermatomes  CHEST/LUNG: Clear to auscultation bilaterally; No rales, rhonchi, wheezing, or rubs  HEART: Regular rate and rhythm; No murmurs, rubs, or gallops  ABDOMEN: Soft, Nontender, Nondistended; Bowel sounds present  EXTREMITIES:  2+ Peripheral Pulses, No clubbing, cyanosis, or edema  SKIN: No rashes or lesions      ASSESSMENT:   81 y/o male with h/o DM, HTN, HLD, gout, and anxiety presents transfered with CT findings of multilevel thoracic canal stensis in the setting of LE weakness and bowel/bladder symptoms. Transfered for further management and surgical plan.     PLAN:   - Pain:  Increase home Gabapentin dose to 300mg TID  Tylenol 650mg PRN for mild to moderate pain     - Home pain regiment: Gabapentin 300mg at bedtime, Tylenol as needed    - Bowel regimen: Senna    - Nausea ppx: Zofran standing  - Functional Goals: Pt will get OOB with PT today. Pt will resume previous level of activity without impairment from surgery.   - Additional Consults: None recommended.   - Additional Labs/Imaging:  None recommended.   - Follow up, Discharge Planning: pending post op PT/OT  - Pain Management follow up plan: will continue to follow    D/w Dr. Yang   Attending Attestation (For Attendings USE Only)...

## 2023-03-28 NOTE — H&P ADULT - NSHPLABSRESULTS_GEN_ALL_CORE
11.5   8.83  )-----------( 451      ( 28 Mar 2023 01:05 )             34.8   03-28    137  |  104  |  23  ----------------------------<  153<H>  4.0   |  23  |  1.16    Ca    8.8      28 Mar 2023 01:05  Phos  3.1     03-28  Mg     2.0     03-28    PT/INR - ( 28 Mar 2023 01:05 )   PT: 12.6 sec;   INR: 1.06        PTT - ( 28 Mar 2023 01:05 )  PTT:29.8 sec

## 2023-03-28 NOTE — H&P ADULT - NSICDXPASTMEDICALHX_GEN_ALL_CORE_FT
PAST MEDICAL HISTORY:  Anxiety     DM (diabetes mellitus)     Gout     HLD (hyperlipidemia)     HTN (hypertension)     Thoracic spinal stenosis

## 2023-03-28 NOTE — H&P ADULT - NSICDXFAMILYHX_GEN_ALL_CORE_FT
FAMILY HISTORY:  Father  Still living? Unknown  Family history of stroke, Age at diagnosis: Age Unknown    Mother  Still living? Unknown  Family history of heart attack, Age at diagnosis: Age Unknown

## 2023-03-28 NOTE — PROGRESS NOTE ADULT - SUBJECTIVE AND OBJECTIVE BOX
Initial consult note    Patient is a 82y old  Male who presents with a chief complaint of Spinal stenosis (28 Mar 2023 01:04)    INTERVAL EVENTS:    SUBJECTIVE:  Patient was seen and examined at bedside.    Review of systems: Patient denies: fever, chills, dizziness, HA, Changes in vision, CP, dyspnea, nausea or vomiting, dysuria, changes in bowel movements, LE edema. Rest of 12 point Review of systems negative unless otherwise documented elsewhere in note.     Diet, Consistent Carbohydrate w/Evening Snack (03-28-23 @ 03:06) [Active]      Home Medications:  Albuterol (Eqv-ProAir HFA) 90 mcg/inh inhalation aerosol: 2 inhaled every 6 hours as needed for  shortness of breath and/or wheezing (28 Mar 2023 01:24)  allopurinol 300 mg oral tablet: 1 orally once a day (28 Mar 2023 01:16)  amLODIPine 10 mg oral tablet: 1 orally once a day (28 Mar 2023 01:19)  atorvastatin 80 mg oral tablet: 1 orally once a day (28 Mar 2023 01:21)  colchicine 0.6 mg oral tablet: 1 orally 2 times a day (28 Mar 2023 01:16)  D3 50 mcg (2000 intl units) oral capsule: 1 orally once a day (28 Mar 2023 01:28)  gabapentin 300 mg oral tablet: 1 tab(s) orally once a day (at bedtime) (28 Mar 2023 01:22)  glipiZIDE 5 mg oral tablet: 1 orally once a day (28 Mar 2023 01:17)  levothyroxine 25 mcg (0.025 mg) oral capsule: 1 cap(s) orally once a day (28 Mar 2023 01:23)  losartan 50 mg oral tablet: 1 orally once a day (28 Mar 2023 01:19)  MetFORMIN (Eqv-Fortamet) 1000 mg oral tablet, extended release: 1 orally once a day (at bedtime) (28 Mar 2023 01:17)  PARoxetine 30 mg oral tablet: 1 orally once a day (28 Mar 2023 01:25)  Zetia 10 mg oral tablet: 1 orally once a day (28 Mar 2023 01:21)    Social History:  Patient lives with his wife at home in [place], [place]. Retired . [employer]. Denies tobacco, alcohol, ilicit drug use. (28 Mar 2023 01:04)    FAMILY HISTORY:  Family history of stroke (Father)    Family history of heart attack (Mother)        MEDICATIONS:  MEDICATIONS  (STANDING):  amLODIPine   Tablet 10 milliGRAM(s) Oral daily  atorvastatin 80 milliGRAM(s) Oral at bedtime  gabapentin 300 milliGRAM(s) Oral at bedtime  insulin lispro (ADMELOG) corrective regimen sliding scale   SubCutaneous Before meals and at bedtime  losartan 50 milliGRAM(s) Oral daily  multivitamin 1 Tablet(s) Oral daily  PAST MEDICAL & SURGICAL HISTORY:  HTN (hypertension)      HLD (hyperlipidemia)      Gout      Anxiety      DM (diabetes mellitus)      Thoracic spinal stenosis      HTN (hypertension)      HTN (hypertension)      History of carpal tunnel surgery of left wrist      History of prostate surgery      PARoxetine 30 milliGRAM(s) Oral daily  polyethylene glycol 3350 17 Gram(s) Oral daily  senna 2 Tablet(s) Oral at bedtime    MEDICATIONS  (PRN):  acetaminophen     Tablet .. 650 milliGRAM(s) Oral every 6 hours PRN Temp greater or equal to 38C (100.4F), Mild Pain (1 - 3)  albuterol    90 MICROgram(s) HFA Inhaler 2 Puff(s) Inhalation every 6 hours PRN for shortness of breath and/or wheezing  ondansetron Injectable 4 milliGRAM(s) IV Push every 6 hours PRN Nausea and/or Vomiting      Allergies    niacin (Rash (Mild))  Shrimp (Anaphylaxis)    Intolerances        OBJECTIVE:  Vital Signs Last 24 Hrs  T(C): 36.4 (28 Mar 2023 08:43), Max: 36.8 (27 Mar 2023 23:36)  T(F): 97.6 (28 Mar 2023 08:43), Max: 98.2 (27 Mar 2023 23:36)  HR: 69 (28 Mar 2023 08:43) (69 - 95)  BP: 136/72 (28 Mar 2023 08:43) (136/72 - 169/75)  BP(mean): 94 (28 Mar 2023 08:43) (94 - 94)  RR: 16 (28 Mar 2023 08:43) (16 - 18)  SpO2: 96% (28 Mar 2023 08:43) (96% - 99%)    Parameters below as of 28 Mar 2023 08:43  Patient On (Oxygen Delivery Method): room air      I&O's Summary    27 Mar 2023 07:01  -  28 Mar 2023 07:00  --------------------------------------------------------  IN: 0 mL / OUT: 600 mL / NET: -600 mL        PHYSICAL EXAM:  Gen: Reclining in bed at time of exam, appears stated age  HEENT: NCAT, MMM, clear OP  Neck: supple, trachea at midline  CV: RRR, +S1/S2  Pulm: adequate respiratory effort, no increase in work of breathing  Abd: soft, NTND  Skin: warm and dry, no new rashes vs prior report  Ext: WWP, no LE edema  Neuro: AOx3, no gross focal neurological deficits  Psych: affect and behavior appropriate, pleasant at time of interview    LABS:                        11.9   7.86  )-----------( 486      ( 28 Mar 2023 05:30 )             36.9     03-28    138  |  102  |  23  ----------------------------<  114<H>  4.0   |  24  |  1.09    Ca    9.3      28 Mar 2023 05:30  Phos  3.3     03-28  Mg     2.3     03-28    TPro  6.6  /  Alb  3.5  /  TBili  0.2  /  DBili  x   /  AST  28  /  ALT  20  /  AlkPhos  99  03-28    LIVER FUNCTIONS - ( 28 Mar 2023 01:05 )  Alb: 3.5 g/dL / Pro: 6.6 g/dL / ALK PHOS: 99 U/L / ALT: 20 U/L / AST: 28 U/L / GGT: x           PT/INR - ( 28 Mar 2023 01:05 )   PT: 12.6 sec;   INR: 1.06          PTT - ( 28 Mar 2023 01:05 )  PTT:29.8 sec  CAPILLARY BLOOD GLUCOSE      POCT Blood Glucose.: 115 mg/dL (28 Mar 2023 06:46)  POCT Blood Glucose.: 177 mg/dL (27 Mar 2023 23:37)        MICRODATA:      RADIOLOGY/OTHER STUDIES:   Initial consult note    Patient is a 82y old  Male who presents with a chief complaint of Spinal stenosis (28 Mar 2023 01:04)    INTERVAL EVENTS: JOJO    SUBJECTIVE:  Patient was seen and examined at bedside. Patient has no acute complaints. Feels well    Review of systems: Patient denies: fever, chills, dizziness, HA, Changes in vision, CP, dyspnea, nausea or vomiting, dysuria, changes in bowel movements, LE edema. Rest of 12 point Review of systems negative unless otherwise documented elsewhere in note.     Diet, Consistent Carbohydrate w/Evening Snack (03-28-23 @ 03:06) [Active]      Home Medications:  Albuterol (Eqv-ProAir HFA) 90 mcg/inh inhalation aerosol: 2 inhaled every 6 hours as needed for  shortness of breath and/or wheezing (28 Mar 2023 01:24)  allopurinol 300 mg oral tablet: 1 orally once a day (28 Mar 2023 01:16)  amLODIPine 10 mg oral tablet: 1 orally once a day (28 Mar 2023 01:19)  atorvastatin 80 mg oral tablet: 1 orally once a day (28 Mar 2023 01:21)  colchicine 0.6 mg oral tablet: 1 orally 2 times a day (28 Mar 2023 01:16)  D3 50 mcg (2000 intl units) oral capsule: 1 orally once a day (28 Mar 2023 01:28)  gabapentin 300 mg oral tablet: 1 tab(s) orally once a day (at bedtime) (28 Mar 2023 01:22)  glipiZIDE 5 mg oral tablet: 1 orally once a day (28 Mar 2023 01:17)  levothyroxine 25 mcg (0.025 mg) oral capsule: 1 cap(s) orally once a day (28 Mar 2023 01:23)  losartan 50 mg oral tablet: 1 orally once a day (28 Mar 2023 01:19)  MetFORMIN (Eqv-Fortamet) 1000 mg oral tablet, extended release: 1 orally once a day (at bedtime) (28 Mar 2023 01:17)  PARoxetine 30 mg oral tablet: 1 orally once a day (28 Mar 2023 01:25)  Zetia 10 mg oral tablet: 1 orally once a day (28 Mar 2023 01:21)    Social History:  Patient lives with his wife at home in [place], [place]. Retired .  of [employer]. Denies tobacco, alcohol, ilicit drug use. (28 Mar 2023 01:04)    FAMILY HISTORY:  Family history of stroke (Father)    Family history of heart attack (Mother)        MEDICATIONS:  MEDICATIONS  (STANDING):  amLODIPine   Tablet 10 milliGRAM(s) Oral daily  atorvastatin 80 milliGRAM(s) Oral at bedtime  gabapentin 300 milliGRAM(s) Oral at bedtime  insulin lispro (ADMELOG) corrective regimen sliding scale   SubCutaneous Before meals and at bedtime  losartan 50 milliGRAM(s) Oral daily  multivitamin 1 Tablet(s) Oral daily  PAST MEDICAL & SURGICAL HISTORY:  HTN (hypertension)      HLD (hyperlipidemia)      Gout      Anxiety      DM (diabetes mellitus)      Thoracic spinal stenosis      HTN (hypertension)      HTN (hypertension)      History of carpal tunnel surgery of left wrist      History of prostate surgery      PARoxetine 30 milliGRAM(s) Oral daily  polyethylene glycol 3350 17 Gram(s) Oral daily  senna 2 Tablet(s) Oral at bedtime    MEDICATIONS  (PRN):  acetaminophen     Tablet .. 650 milliGRAM(s) Oral every 6 hours PRN Temp greater or equal to 38C (100.4F), Mild Pain (1 - 3)  albuterol    90 MICROgram(s) HFA Inhaler 2 Puff(s) Inhalation every 6 hours PRN for shortness of breath and/or wheezing  ondansetron Injectable 4 milliGRAM(s) IV Push every 6 hours PRN Nausea and/or Vomiting      Allergies    niacin (Rash (Mild))  Shrimp (Anaphylaxis)    Intolerances        OBJECTIVE:  Vital Signs Last 24 Hrs  T(C): 36.4 (28 Mar 2023 08:43), Max: 36.8 (27 Mar 2023 23:36)  T(F): 97.6 (28 Mar 2023 08:43), Max: 98.2 (27 Mar 2023 23:36)  HR: 69 (28 Mar 2023 08:43) (69 - 95)  BP: 136/72 (28 Mar 2023 08:43) (136/72 - 169/75)  BP(mean): 94 (28 Mar 2023 08:43) (94 - 94)  RR: 16 (28 Mar 2023 08:43) (16 - 18)  SpO2: 96% (28 Mar 2023 08:43) (96% - 99%)    Parameters below as of 28 Mar 2023 08:43  Patient On (Oxygen Delivery Method): room air      I&O's Summary    27 Mar 2023 07:01  -  28 Mar 2023 07:00  --------------------------------------------------------  IN: 0 mL / OUT: 600 mL / NET: -600 mL        PHYSICAL EXAM:  Gen: Reclining in bed at time of exam, appears stated age  HEENT: NCAT, MMM, clear OP  Neck: supple, trachea at midline  CV: RRR, +S1/S2, b/l LE edema pitting + 1 to knees  Pulm: adequate respiratory effort, no increase in work of breathing  Abd: soft, NTND  Skin: warm and dry, no new rashes vs prior report  Ext: WWP, no LE edema  Neuro: AOx3, no gross focal neurological deficits  Psych: affect and behavior appropriate, pleasant at time of interview    LABS:                        11.9   7.86  )-----------( 486      ( 28 Mar 2023 05:30 )             36.9     03-28    138  |  102  |  23  ----------------------------<  114<H>  4.0   |  24  |  1.09    Ca    9.3      28 Mar 2023 05:30  Phos  3.3     03-28  Mg     2.3     03-28    TPro  6.6  /  Alb  3.5  /  TBili  0.2  /  DBili  x   /  AST  28  /  ALT  20  /  AlkPhos  99  03-28    LIVER FUNCTIONS - ( 28 Mar 2023 01:05 )  Alb: 3.5 g/dL / Pro: 6.6 g/dL / ALK PHOS: 99 U/L / ALT: 20 U/L / AST: 28 U/L / GGT: x           PT/INR - ( 28 Mar 2023 01:05 )   PT: 12.6 sec;   INR: 1.06          PTT - ( 28 Mar 2023 01:05 )  PTT:29.8 sec  CAPILLARY BLOOD GLUCOSE      POCT Blood Glucose.: 115 mg/dL (28 Mar 2023 06:46)  POCT Blood Glucose.: 177 mg/dL (27 Mar 2023 23:37)        MICRODATA:      RADIOLOGY/OTHER STUDIES:

## 2023-03-28 NOTE — H&P ADULT - ASSESSMENT
81 y/o male with h/o DM, HTN, HLD, gout, and anxiety presents transfered with CT findings of multilevel thoracic canal stensis in the setting of LE weakness and bowel/bladder symptoms. Transfered for further management and surgical plan.

## 2023-03-28 NOTE — CONSULT NOTE ADULT - ASSESSMENT
81 y/o male with h/o DM, HTN, HLD, gout, and anxiety presents transfered with CT findings of multilevel thoracic canal stensis in the setting of LE weakness and bowel/bladder symptoms. Transfered for further management and surgical plan. Neurosurgery consulted cardiology for pre-op risk assessment for laminectomy.      Review of systems:   EKG: NST, incomplete RBBB; LVH ( form march 24th 2023)  ECHO form [facility] (3/27/23): nromal EF 69%; GIIDD; elevated LA pressure; mild LVH; mild LA enlargement; moderate AS ( with peak velocity of 3.3; peak gradient 43 mmHG; mean gradient 43 mmHg; aortic valve area of 1 cm2); mild MR; PASP 34 m,Hg.    # pre-op risk assessment  Patient reports that for the past 3 months his ET has been limited in a setting of LE weakness. Prior to this patient reports no limitations. He also reports that in this setting his ankles would swell up occasionally. Patient also noted he is able to walk 2 flights of stairs prior. He also had a hand surgery about a year ago with general anesthesia w/o issues. Patient denies smoking, does smoke an occasional cigar.   Home medications: zetia 10 mg po daily, amlodipine 10 mg dialy; losartan 50 mg daily.   Patient appears euvolemic on physical exam, please add a BNP for baseline. Trace LE swelling can be in a setting of lower mobility or amlodipine  METS around 4   RCRI: zero point, 3.9% risk of 30 day of death, MI, or cardiac arrest   Patient is with at least moderate AS which outs him at higher risk, despite having lower RCRI. Recommend local anesthesia if possible, avoid hypertension/hypotension and excessive fluids.  HTN: c/w Amlodipine 10 mg daily; can hold Losartan on the day of the surgery, restart post operatively  HLD: c/w atorvastatin 80 mg daily  Patient is intermediate risk for an intermediate risk procedure laminectomy    81 y/o male with h/o DM, HTN, HLD, gout, and anxiety presents transfered with CT findings of multilevel thoracic canal stensis in the setting of LE weakness and bowel/bladder symptoms. Transfered for further management and surgical plan. Neurosurgery consulted cardiology for pre-op risk assessment for laminectomy.      Review of systems:   EKG: NST, incomplete RBBB; LVH ( form march 24th 2023)  ECHO form [facility] (3/27/23): nromal EF 69%; GIIDD; elevated LA pressure; mild LVH; mild LA enlargement; moderate AS ( with peak velocity of 3.3; peak gradient 43 mmHG; mean gradient 43 mmHg; aortic valve area of 1 cm2); mild MR; PASP 34 m,Hg.    # pre-op risk assessment  Patient reports that for the past 3 months his ET has been limited in a setting of LE weakness. Prior to this patient reports no limitations. He also reports that in this setting his ankles would swell up occasionally. Patient also noted he is able to walk 2 flights of stairs prior. He also had a hand surgery about a year ago with general anesthesia w/o issues. Patient denies smoking, does smoke an occasional cigar.   Home medications: zetia 10 mg po daily, amlodipine 10 mg dialy; losartan 50 mg daily.   Patient appears euvolemic on physical exam, please add a BNP for baseline. Trace LE swelling can be in a setting of lower mobility or amlodipine  METS around 4   RCRI: zero point, 3.9% risk of 30 day of death, MI, or cardiac arrest   Patient is with at least moderate AS which outs him at higher risk, despite having lower RCRI. Recommend local anesthesia if possible (epidural), avoid hypertension/hypotension and excessive fluids.  If going to use general anesthesia, please utilize cardiac anesthesia.  HTN: c/w Amlodipine 10 mg daily; can hold Losartan on the day of the surgery, restart post operatively  HLD: c/w atorvastatin 80 mg daily  Patient is intermediate risk for an intermediate risk procedure laminectomy

## 2023-03-28 NOTE — CONSULT NOTE ADULT - SUBJECTIVE AND OBJECTIVE BOX
HPI:  83 y/o male with h/o DM, HTN, HLD, Hypothyroidism, gout, and anxiety presents transferred with CT findings of multilevel thoracic canal stenosis in the setting of LE weakness and bowel/bladder symptoms. Presented to [facility] on 2/25 after neurologist, Dr. Dailey recommended CT demonstrating concern for T10 compression. Presents with complaints of progressive worsening of b/l LE weakness and ambulatory disfunction x 3 months (previously ambulatory then requiring a cane, walker, and now unable to ambulate). Describes a tingling sensation in his toes but no loss of sensation in the lower extremities. Also reports dysuria, urinary retention, and saddle anesthesia over the last 2 weeks with intermittent bowel and bladder incontinence. Denies headache, chest pain, palpitations, visual abnormalities, nausea/vomiting, fever, chills, weight loss/gain. Transfered for further management and surgical plan.  (28 Mar 2023 01:04)      ROS: A 10-point review of systems was otherwise negative.    PAST MEDICAL & SURGICAL HISTORY:  HTN (hypertension)      HLD (hyperlipidemia)      Gout      Anxiety      DM (diabetes mellitus)      Thoracic spinal stenosis      HTN (hypertension)      HTN (hypertension)      History of carpal tunnel surgery of left wrist      History of prostate surgery        SOCIAL HISTORY:  FAMILY HISTORY:  Family history of stroke (Father)    Family history of heart attack (Mother)      ALLERGIES: 	  niacin (Rash (Mild))  Shrimp (Anaphylaxis)          MEDICATIONS:  acetaminophen     Tablet .. 650 milliGRAM(s) Oral every 6 hours PRN  albuterol    90 MICROgram(s) HFA Inhaler 2 Puff(s) Inhalation every 6 hours PRN  amLODIPine   Tablet 10 milliGRAM(s) Oral daily  atorvastatin 80 milliGRAM(s) Oral at bedtime  gabapentin 300 milliGRAM(s) Oral every 8 hours  insulin lispro (ADMELOG) corrective regimen sliding scale   SubCutaneous Before meals and at bedtime  losartan 50 milliGRAM(s) Oral daily  methocarbamol 500 milliGRAM(s) Oral every 8 hours PRN  multivitamin 1 Tablet(s) Oral daily  ondansetron Injectable 4 milliGRAM(s) IV Push every 6 hours PRN  PARoxetine 30 milliGRAM(s) Oral daily  polyethylene glycol 3350 17 Gram(s) Oral daily  senna 2 Tablet(s) Oral at bedtime      PHYSICAL EXAM:  T(C): 36.4 (03-28-23 @ 08:43), Max: 36.8 (03-27-23 @ 23:36)  HR: 69 (03-28-23 @ 08:43) (69 - 95)  BP: 136/72 (03-28-23 @ 08:43) (136/72 - 169/75)  RR: 16 (03-28-23 @ 08:43) (16 - 18)  SpO2: 96% (03-28-23 @ 08:43) (96% - 99%)  Wt(kg): --    GEN: Awake, comfortable. NAD. sitting in a chair eating   HEENT: NCAT  RESP: CTA b/l  CV: RRR, normal s1/s2. No m/r/g.  EXT: Warm. No edema  NEURO: AAOx3.     I&O's Summary    27 Mar 2023 07:01  -  28 Mar 2023 07:00  --------------------------------------------------------  IN: 0 mL / OUT: 600 mL / NET: -600 mL      Height (cm): 165.1 (03-28 @ 00:43)  Weight (kg): 97.069 (03-28 @ 00:43)  BMI (kg/m2): 35.6 (03-28 @ 00:43)  BSA (m2): 2.04 (03-28 @ 00:43)  LABS:	 	                        11.9   7.86  )-----------( 486      ( 28 Mar 2023 05:30 )             36.9     03-28    138  |  102  |  23  ----------------------------<  114<H>  4.0   |  24  |  1.09    Ca    9.3      28 Mar 2023 05:30  Phos  3.3     03-28  Mg     2.3     03-28    TPro  6.6  /  Alb  3.5  /  TBili  0.2  /  DBili  x   /  AST  28  /  ALT  20  /  AlkPhos  99  03-28

## 2023-03-28 NOTE — H&P ADULT - NSHPPHYSICALEXAM_GEN_ALL_CORE
Constitutional: 81 y/o elderly male awake, alert in no acute distress seated at the edge of the bed. Wears glasses.   Eyes:  Sclera anicteric, conjunctiva noninjected.   ENMT: Oropharyngeal mucosa moist, pink. Tongue midline.    Respiratory: Clear to auscultation bilaterally.   Cardiovascular: Regular rate and rhythm.   Gastrointestinal:  Soft, nontender, nondistended.   Vascular: Extremities warm, no ulcers, no discoloration of skin.   Neurological: AA&O x 3, conversant, appropriate. CN II-XII grossly intact. ROSA x 4, 5/5 throughout UE. LE 4/5 B/L. Sensation intact to light touch throughout. DTRs: 2+ symmetric throughout. Reveles's negative bilaterally.  Plantar downgoing bilaterally.  No clonus. No pronator drift, no dysmetria.   Skin: Warm, dry, no erythema. B/L lower extremity pitting edema. Hands and toes with gouty inflammation.

## 2023-03-28 NOTE — PROGRESS NOTE ADULT - PROBLEM SELECTOR PLAN 2
Given Echo findings (Diastolic dysfunction) + exam - b/l LE edema. please consult cardiology for preoperative optimization as appears overloaded

## 2023-03-29 PROBLEM — I10 ESSENTIAL (PRIMARY) HYPERTENSION: Chronic | Status: ACTIVE | Noted: 2023-01-01

## 2023-03-29 PROBLEM — E78.5 HYPERLIPIDEMIA, UNSPECIFIED: Chronic | Status: ACTIVE | Noted: 2023-01-01

## 2023-03-29 PROBLEM — E11.9 TYPE 2 DIABETES MELLITUS WITHOUT COMPLICATIONS: Chronic | Status: ACTIVE | Noted: 2023-01-01

## 2023-03-29 PROBLEM — F41.9 ANXIETY DISORDER, UNSPECIFIED: Chronic | Status: ACTIVE | Noted: 2023-01-01

## 2023-03-29 PROBLEM — M48.04 SPINAL STENOSIS, THORACIC REGION: Chronic | Status: ACTIVE | Noted: 2023-01-01

## 2023-03-29 PROBLEM — M10.9 GOUT, UNSPECIFIED: Chronic | Status: ACTIVE | Noted: 2023-01-01

## 2023-03-29 PROBLEM — Z00.00 ENCOUNTER FOR PREVENTIVE HEALTH EXAMINATION: Status: ACTIVE | Noted: 2023-01-01

## 2023-03-29 NOTE — PROGRESS NOTE ADULT - SUBJECTIVE AND OBJECTIVE BOX
Cardiology Consult    O/N:  Interval History: patient was seen and examined       OBJECTIVE  Vitals:  T(C): 36.3 (03-29-23 @ 08:48), Max: 36.6 (03-29-23 @ 05:09)  HR: 70 (03-29-23 @ 08:48) (70 - 84)  BP: 158/76 (03-29-23 @ 08:48) (125/66 - 158/76)  RR: 16 (03-29-23 @ 08:48) (16 - 17)  SpO2: 94% (03-29-23 @ 08:48) (94% - 97%)  Wt(kg): --    I/O:  I&O's Summary    28 Mar 2023 07:01  -  29 Mar 2023 07:00  --------------------------------------------------------  IN: 480 mL / OUT: 1300 mL / NET: -820 mL        PHYSICAL EXAM:  GEN: Awake, comfortable. NAD. sitting in a chair eating   HEENT: NCAT  RESP: CTA b/l  CV: RRR, normal s1/s2. systolic murmur   EXT: Warm. No edema  NEURO: AAOx3.    	  LABS:                        12.4   7.32  )-----------( 463      ( 29 Mar 2023 07:04 )             38.9     03-29    137  |  105  |  25<H>  ----------------------------<  112<H>  4.7   |  22  |  0.97    Ca    9.5      29 Mar 2023 07:04  Phos  3.7     03-29  Mg     2.1     03-29    TPro  6.6  /  Alb  3.5  /  TBili  0.2  /  DBili  x   /  AST  28  /  ALT  20  /  AlkPhos  99  03-28    PT/INR - ( 29 Mar 2023 07:04 )   PT: 12.2 sec;   INR: 1.02          PTT - ( 29 Mar 2023 07:04 )  PTT:33.4 sec      RADIOLOGY & ADDITIONAL TESTS:  Reviewed .    MEDICATIONS  (STANDING):  amLODIPine   Tablet 10 milliGRAM(s) Oral daily  atorvastatin 80 milliGRAM(s) Oral at bedtime  chlorhexidine 2% Cloths 1 Application(s) Topical every 12 hours  gabapentin 300 milliGRAM(s) Oral every 8 hours  insulin lispro (ADMELOG) corrective regimen sliding scale   SubCutaneous Before meals and at bedtime  multivitamin 1 Tablet(s) Oral daily  PARoxetine 30 milliGRAM(s) Oral daily  polyethylene glycol 3350 17 Gram(s) Oral daily  senna 2 Tablet(s) Oral at bedtime  sodium chloride 0.9%. 1000 milliLiter(s) (30 mL/Hr) IV Continuous <Continuous>    MEDICATIONS  (PRN):  acetaminophen     Tablet .. 650 milliGRAM(s) Oral every 6 hours PRN Temp greater or equal to 38C (100.4F), Mild Pain (1 - 3)  albuterol    90 MICROgram(s) HFA Inhaler 2 Puff(s) Inhalation every 6 hours PRN for shortness of breath and/or wheezing  diphenhydrAMINE 50 milliGRAM(s) Oral at bedtime PRN Insomnia  methocarbamol 500 milliGRAM(s) Oral every 8 hours PRN Muscle Spasm  ondansetron Injectable 4 milliGRAM(s) IV Push every 6 hours PRN Nausea and/or Vomiting

## 2023-03-29 NOTE — CONSULT NOTE ADULT - ASSESSMENT
Assesment:  83 YO Male w/ PMHx of DM, HTN, HLD, Hypothyroidism, gout, and anxiety who was transferred from [facility] following CT findings of multilevel thoracic canal stenosis i/s/o worsening b/l LE weakness and bowel/bladder symptoms x 3 months. Patient was transferred to [facility] for further management and surgical plan. Cardiology consulted for pre-surgical clearance and found to have had TTE from [facility] on 3/27/23 which revealed normal EF 69%; GIIDD; elevated LA pressure; mild LVH; mild LA enlargement; moderate AS ( with peak velocity of 3.3; peak gradient 43 mmHG; mean gradient 43 mmHg; aortic valve area of 1 cm2); mild MR; PASP 34 mmHg. Structural heart team consulted for moderate AS.     Plan:  Problem 1: Moderate AS  -Pending further discussion with Dr. Blanca  -Will follow-up repeat TTE  -Patient will need outpatient follow-up with Dr. Blanca    Problem 2: Thoracic canal stenosis   -OR tomorrow with neurosurgery team  -Care as per primary team    Problem 3: HTN  -Cont Norvasc  -Care per primary team    Problem 4: HLD  -Cont Lipitor   -Care per primary team    I have reviewed clinical labs tests and reports, radiology tests and reports, as well as old patient medical records, and discussed with the refering physician.     Assesment:  81 YO Male w/ PMHx of DM, HTN, HLD, Hypothyroidism, gout, and anxiety who was transferred from [facility] following CT findings of multilevel thoracic canal stenosis i/s/o worsening b/l LE weakness and bowel/bladder symptoms x 3 months. Patient was transferred to [facility] for further management and surgical plan. Cardiology consulted for pre-surgical clearance and found to have had TTE from [facility] on 3/27/23 which revealed normal EF 69%; GIIDD; elevated LA pressure; mild LVH; mild LA enlargement; moderate AS ( with peak velocity of 3.3; peak gradient 43 mmHG; mean gradient 43 mmHg; aortic valve area of 1 cm2); mild MR; PASP 34 mmHg. Structural heart team consulted for moderate AS.     Plan:  Problem 1: Moderate AS  -Pending further discussion with Dr. Bhat  -Will follow-up repeat TTE  -Patient will need outpatient follow-up with Dr. Bhat    Problem 2: Thoracic canal stenosis   -OR tomorrow with neurosurgery team  -Care as per primary team    Problem 3: HTN  -Cont Norvasc  -Care per primary team    Problem 4: HLD  -Cont Lipitor   -Care per primary team    I have reviewed clinical labs tests and reports, radiology tests and reports, as well as old patient medical records, and discussed with the refering physician.

## 2023-03-29 NOTE — CONSULT NOTE ADULT - SUBJECTIVE AND OBJECTIVE BOX
Surgeon: Dr. Blanca     Requesting Physician: Dr. Vargas     HISTORY OF PRESENT ILLNESS: 83 YO Male w/ PMHx of DM, HTN, HLD, Hypothyroidism, gout, and anxiety who was transferred from [facility] following CT findings of multilevel thoracic canal stenosis i/s/o worsening b/l LE weakness and bowel/bladder symptoms x 3 months. Patient was transferred to [facility] for further management and surgical plan. Cardiology consulted for pre-surgical clearance and found to have had TTE from [place] on 3/27/23 which revealed normal EF 69%; GIIDD; elevated LA pressure; mild LVH; mild LA enlargement; moderate AS ( with peak velocity of 3.3; peak gradient 43 mmHG; mean gradient 43 mmHg; aortic valve area of 1 cm2); mild MR; PASP 34 mmHg. Structural heart team consulted for moderate AS. Patient seen and examined at bedside. Patient states that he was diagnosed with MVP and AS [facility] year ago, but he was not given any further instructions for follow-up. Denies symptoms including chest pain, SOB, palpitations, SIMMONS, N/V.     PAST MEDICAL & SURGICAL HISTORY:  HTN (hypertension)    HLD (hyperlipidemia)    Gout    Anxiety    DM (diabetes mellitus)    Thoracic spinal stenosis    History of carpal tunnel surgery of left wrist    History of prostate surgery    MEDICATIONS  (STANDING):  amLODIPine   Tablet 10 milliGRAM(s) Oral daily  atorvastatin 80 milliGRAM(s) Oral at bedtime  chlorhexidine 2% Cloths 1 Application(s) Topical every 12 hours  gabapentin 300 milliGRAM(s) Oral every 8 hours  insulin lispro (ADMELOG) corrective regimen sliding scale   SubCutaneous Before meals and at bedtime  multivitamin 1 Tablet(s) Oral daily  PARoxetine 30 milliGRAM(s) Oral daily  polyethylene glycol 3350 17 Gram(s) Oral daily  senna 2 Tablet(s) Oral at bedtime  sodium chloride 0.9%. 1000 milliLiter(s) (30 mL/Hr) IV Continuous <Continuous>    MEDICATIONS  (PRN):  acetaminophen     Tablet .. 650 milliGRAM(s) Oral every 6 hours PRN Temp greater or equal to 38C (100.4F), Mild Pain (1 - 3)  albuterol    90 MICROgram(s) HFA Inhaler 2 Puff(s) Inhalation every 6 hours PRN for shortness of breath and/or wheezing  diphenhydrAMINE 50 milliGRAM(s) Oral at bedtime PRN Insomnia  methocarbamol 500 milliGRAM(s) Oral every 8 hours PRN Muscle Spasm  ondansetron Injectable 4 milliGRAM(s) IV Push every 6 hours PRN Nausea and/or Vomiting    Allergies    niacin (Rash (Mild))  Shrimp (Anaphylaxis)    Intolerances    SOCIAL HISTORY:  Patient lives with his wife at home in [place], [place]. Retired . [employer]. Denies tobacco, alcohol, ilicit drug use    FAMILY HISTORY:  Family history of stroke (Father)    Family history of heart attack (Mother)    Review of Systems:  CONSTITUTIONAL: Denies fevers / chills, sweats, fatigue, weight loss, weight gain                                       NEURO:  Denies parathesias, seizures, syncope, confusion                                                                                  EYES:  Denies blurry vision, discharge, pain, loss of vision                                                                                    ENMT:  Denies difficulty hearing, vertigo, dysphagia, epistaxis, recent dental work                                       CV:  Denies chest pain, palpitations, SIMMONS, orthopnea                                                                                           RESPIRATORY:  Denies wWheezing, SOB, cough / sputum, hemoptysis                                                               GI:  Denies nausea, vomiting, diarrhea, constipation, melena                                                                          : Denies hematuria, dysuria, urgency, incontinence                                                                                          MUSKULOSKELETAL: +back pain. Denies arthritis, joint swelling, muscle weakness                                                             SKIN/BREAST:  Denies rash, itching, hair loss, masses                                                                                              PSYCH:  Denies depression, anxiety, suicidal ideation                                                                                                HEME/LYMPH:  Denies bruises easily, enlarged lymph nodes, tender lymph nodes                                          ENDOCRINE:  Denies cold intolerance, heat intolerance, polydipsia                                                                      Vital Signs Last 24 Hrs  T(C): 36.6 (29 Mar 2023 16:38), Max: 36.6 (29 Mar 2023 05:09)  T(F): 97.8 (29 Mar 2023 16:38), Max: 97.9 (29 Mar 2023 05:09)  HR: 75 (29 Mar 2023 16:38) (70 - 78)  BP: 132/70 (29 Mar 2023 16:38) (132/70 - 158/76)  BP(mean): 94 (29 Mar 2023 16:21) (94 - 94)  RR: 18 (29 Mar 2023 16:38) (16 - 18)  SpO2: 94% (29 Mar 2023 16:38) (94% - 96%)    Parameters below as of 29 Mar 2023 16:38  Patient On (Oxygen Delivery Method): room air    PHYSICAL EXAM:  GENERAL: NAD, sitting upright in bed  HEAD:  Atraumatic, Normocephalic  EYES: EOMI, PERRLA, conjunctiva and sclera clear  ENT: Moist mucous membranes  NECK: Supple, No JVD  CHEST/LUNG: CTAB  HEART: +Murmur. Regular rate and rhythm  ABDOMEN: Bowel sounds present; Soft, Nontender, Nondistended. No hepatomegally  EXTREMITIES:  2+ Peripheral Pulses, brisk capillary refill. No clubbing, cyanosis, or edema  NERVOUS SYSTEM:  Alert & Oriented X3, speech clear. No deficits                                                           LABS:                        12.4   7.32  )-----------( 463      ( 29 Mar 2023 07:04 )             38.9     03-29    137  |  105  |  25<H>  ----------------------------<  112<H>  4.7   |  22  |  0.97    Ca    9.5      29 Mar 2023 07:04  Phos  3.7     03-29  Mg     2.1     03-29    TPro  6.6  /  Alb  3.5  /  TBili  0.2  /  DBili  x   /  AST  28  /  ALT  20  /  AlkPhos  99  03-28    PT/INR - ( 29 Mar 2023 07:04 )   PT: 12.2 sec;   INR: 1.02       PTT - ( 29 Mar 2023 07:04 )  PTT:33.4 sec    RADIOLOGY & ADDITIONAL STUDIES:  CXR:  < from: Xray Chest 1 View AP/PA (03.28.23 @ 07:06) >    Findings/  impression: Stable elevation of the right hemidiaphragm. Right basilar   focal atelectasis.. Stable heart size and bony structures.    CAROTID U/S:  < from: US Duplex Venous Lower Ext Complete, Bilateral (03.28.23 @ 14:34) >  FINDINGS:    RIGHT:  Normal compressibility of the RIGHT common femoral, femoral and popliteal   veins.  Doppler examination shows normal spontaneous and phasic flow.  No RIGHT calf vein thrombosis is detected.    4.1 x 1.7 x 2.4 cm avascular cystic collection in the right popliteal   fossa consistent with Baker's cyst.    LEFT:  Normal compressibility of the LEFT common femoral, femoral and popliteal   veins.  Doppler examination shows normal spontaneous and phasic flow.  No LEFT calf vein thrombosis is detected.    6.5 x 1.8 x 2.5 cm avascular cystic collection in the left popliteal   fossa consistent with Baker's cyst.    IMPRESSION:  No evidence of deep venous thrombosis in either lower extremity.    TTE / JACKIE 3/27 at [place]: normal EF 69%; GIIDD; elevated LA pressure; mild LVH; mild LA enlargement; moderate AS ( with peak velocity of 3.3; peak gradient 43 mmHG; mean gradient 43 mmHg; aortic valve area of 1 cm2); mild MR; PASP 34 mmHg Surgeon: Dr. Bhat    Requesting Physician: Dr. Vargas     HISTORY OF PRESENT ILLNESS: 81 YO Male w/ PMHx of DM, HTN, HLD, Hypothyroidism, gout, and anxiety who was transferred from [facility] following CT findings of multilevel thoracic canal stenosis i/s/o worsening b/l LE weakness and bowel/bladder symptoms x 3 months. Patient was transferred to [facility] for further management and surgical plan. Cardiology consulted for pre-surgical clearance and found to have had TTE from [place] on 3/27/23 which revealed normal EF 69%; GIIDD; elevated LA pressure; mild LVH; mild LA enlargement; moderate AS ( with peak velocity of 3.3; peak gradient 43 mmHG; mean gradient 43 mmHg; aortic valve area of 1 cm2); mild MR; PASP 34 mmHg. Structural heart team consulted for moderate AS. Patient seen and examined at bedside. Patient states that he was diagnosed with MVP and AS [facility], but he was not given any further instructions for follow-up. Denies symptoms including chest pain, SOB, palpitations, SIMMONS, N/V.     PAST MEDICAL & SURGICAL HISTORY:  HTN (hypertension)    HLD (hyperlipidemia)    Gout    Anxiety    DM (diabetes mellitus)    Thoracic spinal stenosis    History of carpal tunnel surgery of left wrist    History of prostate surgery    MEDICATIONS  (STANDING):  amLODIPine   Tablet 10 milliGRAM(s) Oral daily  atorvastatin 80 milliGRAM(s) Oral at bedtime  chlorhexidine 2% Cloths 1 Application(s) Topical every 12 hours  gabapentin 300 milliGRAM(s) Oral every 8 hours  insulin lispro (ADMELOG) corrective regimen sliding scale   SubCutaneous Before meals and at bedtime  multivitamin 1 Tablet(s) Oral daily  PARoxetine 30 milliGRAM(s) Oral daily  polyethylene glycol 3350 17 Gram(s) Oral daily  senna 2 Tablet(s) Oral at bedtime  sodium chloride 0.9%. 1000 milliLiter(s) (30 mL/Hr) IV Continuous <Continuous>    MEDICATIONS  (PRN):  acetaminophen     Tablet .. 650 milliGRAM(s) Oral every 6 hours PRN Temp greater or equal to 38C (100.4F), Mild Pain (1 - 3)  albuterol    90 MICROgram(s) HFA Inhaler 2 Puff(s) Inhalation every 6 hours PRN for shortness of breath and/or wheezing  diphenhydrAMINE 50 milliGRAM(s) Oral at bedtime PRN Insomnia  methocarbamol 500 milliGRAM(s) Oral every 8 hours PRN Muscle Spasm  ondansetron Injectable 4 milliGRAM(s) IV Push every 6 hours PRN Nausea and/or Vomiting    Allergies    niacin (Rash (Mild))  Shrimp (Anaphylaxis)    Intolerances    SOCIAL HISTORY:  Patient lives with his wife at home in [place], [place]. Retired . [employer]. Denies tobacco, alcohol, ilicit drug use    FAMILY HISTORY:  Family history of stroke (Father)    Family history of heart attack (Mother)    Review of Systems:  CONSTITUTIONAL: Denies fevers / chills, sweats, fatigue, weight loss, weight gain                                       NEURO:  Denies parathesias, seizures, syncope, confusion                                                                                  EYES:  Denies blurry vision, discharge, pain, loss of vision                                                                                    ENMT:  Denies difficulty hearing, vertigo, dysphagia, epistaxis, recent dental work                                       CV:  Denies chest pain, palpitations, SIMMONS, orthopnea                                                                                           RESPIRATORY:  Denies wWheezing, SOB, cough / sputum, hemoptysis                                                               GI:  Denies nausea, vomiting, diarrhea, constipation, melena                                                                          : Denies hematuria, dysuria, urgency, incontinence                                                                                          MUSKULOSKELETAL: +back pain. Denies arthritis, joint swelling, muscle weakness                                                             SKIN/BREAST:  Denies rash, itching, hair loss, masses                                                                                              PSYCH:  Denies depression, anxiety, suicidal ideation                                                                                                HEME/LYMPH:  Denies bruises easily, enlarged lymph nodes, tender lymph nodes                                          ENDOCRINE:  Denies cold intolerance, heat intolerance, polydipsia                                                                      Vital Signs Last 24 Hrs  T(C): 36.6 (29 Mar 2023 16:38), Max: 36.6 (29 Mar 2023 05:09)  T(F): 97.8 (29 Mar 2023 16:38), Max: 97.9 (29 Mar 2023 05:09)  HR: 75 (29 Mar 2023 16:38) (70 - 78)  BP: 132/70 (29 Mar 2023 16:38) (132/70 - 158/76)  BP(mean): 94 (29 Mar 2023 16:21) (94 - 94)  RR: 18 (29 Mar 2023 16:38) (16 - 18)  SpO2: 94% (29 Mar 2023 16:38) (94% - 96%)    Parameters below as of 29 Mar 2023 16:38  Patient On (Oxygen Delivery Method): room air    PHYSICAL EXAM:  GENERAL: NAD, sitting upright in bed  HEAD:  Atraumatic, Normocephalic  EYES: EOMI, PERRLA, conjunctiva and sclera clear  ENT: Moist mucous membranes  NECK: Supple, No JVD  CHEST/LUNG: CTAB  HEART: +Murmur. Regular rate and rhythm  ABDOMEN: Bowel sounds present; Soft, Nontender, Nondistended. No hepatomegally  EXTREMITIES:  2+ Peripheral Pulses, brisk capillary refill. No clubbing, cyanosis, or edema  NERVOUS SYSTEM:  Alert & Oriented X3, speech clear. No deficits                                                           LABS:                        12.4   7.32  )-----------( 463      ( 29 Mar 2023 07:04 )             38.9     03-29    137  |  105  |  25<H>  ----------------------------<  112<H>  4.7   |  22  |  0.97    Ca    9.5      29 Mar 2023 07:04  Phos  3.7     03-29  Mg     2.1     03-29    TPro  6.6  /  Alb  3.5  /  TBili  0.2  /  DBili  x   /  AST  28  /  ALT  20  /  AlkPhos  99  03-28    PT/INR - ( 29 Mar 2023 07:04 )   PT: 12.2 sec;   INR: 1.02       PTT - ( 29 Mar 2023 07:04 )  PTT:33.4 sec    RADIOLOGY & ADDITIONAL STUDIES:  CXR:  < from: Xray Chest 1 View AP/PA (03.28.23 @ 07:06) >    Findings/  impression: Stable elevation of the right hemidiaphragm. Right basilar   focal atelectasis.. Stable heart size and bony structures.    CAROTID U/S:  < from: US Duplex Venous Lower Ext Complete, Bilateral (03.28.23 @ 14:34) >  FINDINGS:    RIGHT:  Normal compressibility of the RIGHT common femoral, femoral and popliteal   veins.  Doppler examination shows normal spontaneous and phasic flow.  No RIGHT calf vein thrombosis is detected.    4.1 x 1.7 x 2.4 cm avascular cystic collection in the right popliteal   fossa consistent with Baker's cyst.    LEFT:  Normal compressibility of the LEFT common femoral, femoral and popliteal   veins.  Doppler examination shows normal spontaneous and phasic flow.  No LEFT calf vein thrombosis is detected.    6.5 x 1.8 x 2.5 cm avascular cystic collection in the left popliteal   fossa consistent with Baker's cyst.    IMPRESSION:  No evidence of deep venous thrombosis in either lower extremity.    TTE / JACKIE 3/27 at [facility]: normal EF 69%; GIIDD; elevated LA pressure; mild LVH; mild LA enlargement; moderate AS ( with peak velocity of 3.3; peak gradient 43 mmHG; mean gradient 43 mmHg; aortic valve area of 1 cm2); mild MR; PASP 34 mmHg

## 2023-03-29 NOTE — PRE-ANESTHESIA EVALUATION ADULT - LAST ECHOCARDIOGRAM
3/27/23 report reviewed.  mild LVH, no RWMA, EF 69%, NL RV, moderate AS, PG 43mmHg, MG 23mmHg, SHIRLENE 1.0cm2.  mild MR and TR

## 2023-03-29 NOTE — PROGRESS NOTE ADULT - PROBLEM SELECTOR PLAN 5
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  Preoperative coronary angiography was unremarkable.\par  \par He is tolerating his current dose of aspirin and metoprolol. \par  ECG shows AV block and nonspecific ST segment abnormalities. Unchanged.\par No sign of pericarditis. He may discontinue colchicine.\par EF in the Low normal range, high normal LA size, MVR without regurgitation. Continue beta-blockade.\par  \par Follow-up with me in 4 months as well. Reported history

## 2023-03-29 NOTE — PROGRESS NOTE ADULT - ATTENDING COMMENTS
Initial attending contact date  3/29/23    . See fellow note written above for details. I reviewed the fellow documentation. I have personally seen and examined this patient. I reviewed vitals, labs, medications, cardiac studies, and additional imaging. I agree with the above fellow's findings and plans as written above with the following additions/statements.    81 y/o male with h/o DM, HTN, HLD, gout, and anxiety presents transferred with CT findings of multilevel thoracic canal stenosis in the setting of LE weakness and bowel/bladder symptoms. Transferred for further management and surgical plan. Neurosurgery consulted cardiology for pre-op risk assessment for laminectomy.    -Pt recently with limited functional capacity due to LE weakness, prior able to perform ~ 4 METS without symptoms  -EKG and ECHO findings as noted - with normal LVEF, mod-severe AS by report  -Given mod-severe AS, would recommend spinal anesthesia to mitigate cardiac risk perioperatively. If general anesthesia required, recommend cardiac anesthesia  -CT structural to consult for possible TAVR in the future Initial attending contact date  3/29/23    . See fellow note written above for details. I reviewed the fellow documentation. I have personally seen and examined this patient. I reviewed vitals, labs, medications, cardiac studies, and additional imaging. I agree with the above fellow's findings and plans as written above with the following additions/statements.    81 y/o male with h/o DM, HTN, HLD, gout, and anxiety presents transferred with CT findings of multilevel thoracic canal stenosis in the setting of LE weakness and bowel/bladder symptoms. Transferred for further management and surgical plan. Neurosurgery consulted cardiology for pre-op risk assessment for laminectomy.    -Pt recently with limited functional capacity due to LE weakness, prior able to perform ~ 4 METS without symptoms  -EKG and ECHO findings as noted - with normal LVEF, mod-severe AS by report  -Pt considered int risk for int risk procedure. Given mod-severe AS, would recommend spinal anesthesia to mitigate cardiac risk perioperatively. If general anesthesia required, recommend cardiac anesthesia  -CT structural to consult for possible TAVR in the future

## 2023-03-29 NOTE — CONSULT NOTE ADULT - NS ATTEND AMEND GEN_ALL_CORE FT
Agree with above with the following additional comments.    Pt with evidence of SHIRLENE < 1, mean gradient of at least 33mmHg on recent echo with evidence of calcified aortic valve. Therefore with at least moderate-severe AS. He reports that he does not have significant cardiopulmonary symptoms and biventricular function is normal on echo. However, despite this, agree with Cardiology Consult that pt has an elevated risk for this procedure and cardiac anesthesia presence is recommended. We will re-evaluate pt after surgery. He should f/u in our clinic post-discharge for monitoring of AS. Please call [phone] to set up appointment.

## 2023-03-29 NOTE — PROGRESS NOTE ADULT - SUBJECTIVE AND OBJECTIVE BOX
S: patient seen bedside. Feeling a little groggy this AM, had benadryl last night because he was having difficulty sleeping. At this time denies any acute complaints. Wife present bedside.         PHYSICAL EXAM:  Gen: obese male sitting upright in chair.   HEENT: NCAT, MMM, clear OP  Neck: supple, trachea at midline  CV: RRR, +S1/S2, b/l LE edema pitting + 1 to knees  Pulm: adequate respiratory effort, no increase in work of breathing  Abd: soft, NTND  Skin: warm and dry, no new rashes vs prior report  Ext: WWP, b/l LE edema  Neuro: AOx3, no gross focal neurological deficits  Psych: affect and behavior appropriate, pleasant at time of interview          VS/labs reveiwed. no dvt on US.

## 2023-03-29 NOTE — PROGRESS NOTE ADULT - SUBJECTIVE AND OBJECTIVE BOX
Surgery: T10-11 laminectomy and posterior fusion with instrumentation  Consent: Signed by patient     Representative Consent: [ ] Signed by patient                                                [  ] N/A -> only for cerebral angiogram    niacin (Rash (Mild))  Shrimp (Anaphylaxis)      OVERNIGHT EVENTS: JOJO overnight.     T(C): 36.3 (03-29-23 @ 08:48), Max: 36.6 (03-29-23 @ 05:09)  HR: 70 (03-29-23 @ 08:48) (70 - 84)  BP: 158/76 (03-29-23 @ 08:48) (125/66 - 158/76)  RR: 16 (03-29-23 @ 08:48) (16 - 17)  SpO2: 94% (03-29-23 @ 08:48) (94% - 97%)  Wt(kg): --    EXAM:  General: patient seen laying supine in bed in NAD  Neuro: AAOx3, follows commands, opens eyes spontaneously, speech clear and fluent,  face symmetric, strength 5/5 b/l upper extremities and right lower extremity, LLE HF 4+/5 otherwise 5/5. sensation intact to light touch throughout  HEENT: PERRL  Neck: supple  Cardiac: RRR, S1S2  Pulmonary: chest rise symmetric  Abdomen: soft, nontender, nondistended  Ext: perfusing well  Skin: warm, dry        03-29    137  |  105  |  25<H>  ----------------------------<  112<H>  4.7   |  22  |  0.97    Ca    9.5      29 Mar 2023 07:04  Phos  3.7     03-29  Mg     2.1     03-29    TPro  6.6  /  Alb  3.5  /  TBili  0.2  /  DBili  x   /  AST  28  /  ALT  20  /  AlkPhos  99  03-28    CBC Full  -  ( 29 Mar 2023 07:04 )  WBC Count : 7.32 K/uL  RBC Count : 4.27 M/uL  Hemoglobin : 12.4 g/dL  Hematocrit : 38.9 %  Platelet Count - Automated : 463 K/uL  Mean Cell Volume : 91.1 fl  Mean Cell Hemoglobin : 29.0 pg  Mean Cell Hemoglobin Concentration : 31.9 gm/dL  Auto Neutrophil # : x  Auto Lymphocyte # : x  Auto Monocyte # : x  Auto Eosinophil # : x  Auto Basophil # : x  Auto Neutrophil % : x  Auto Lymphocyte % : x  Auto Monocyte % : x  Auto Eosinophil % : x  Auto Basophil % : x    PT/INR - ( 29 Mar 2023 07:04 )   PT: 12.2 sec;   INR: 1.02          PTT - ( 29 Mar 2023 07:04 )  PTT:33.4 sec    Pregnancy test (serum hcg for any female under 56, must be resulted day before OR): [ ] Negative Result  [ ] Positive Result  [ ] N/A : male or female>57 y/o  Type & Screen (in past 72hrs):     2 Type & Screen within 72 hours if anticipate blood need in OR:  _ Y _ N     Blood ordered and on hold for OR:   [ ] No need     [ ] 1u pRBC on hold      [ ] 2u pRBC on hold    COVID swab (in past 48hrs): _x Y  _N    EKG: NST, incomplete RBBB; LVH ( form march 24th 2023)  ECHO form [facility] (3/27/23): nromal EF 69%; GIIDD; elevated LA pressure; mild LVH; mild LA enlargement; moderate AS ( with peak velocity of 3.3; peak gradient 43 mmHG; mean gradient 43 mmHg; aortic valve area of 1 cm2); mild MR; PASP 34 m,Hg.  Medical Clearances: recommended cardiology preop optimization  Other Clearances:   Cardiology recommendations: Patient is intermediate risk for an intermediate risk procedure laminectomy. Patient is with at least moderate AS which outs him at higher risk, despite having lower RCRI. Recommend local anesthesia if possible (epidural), avoid hypertension/hypotension and excessive fluids. If going to use general anesthesia, please utilize cardiac anesthesia.  Anesthesia consulted 3/29 AM for preop evaluation.    Last dose of antiplatelet/anticoagulation drug: none    Implanted Devices (pacemaker, drug pump...etc):  []YES   [x] NO                  If yes --> EPS consulted to interrogate device: [ ] YES  [ ] NO                            If yes -->  EPS called to let them know patient going for surgery: [ ] device needs to be turned off                                                                                                                                                 [ ] magnet needs to be placed for surgery                                                                                                                                                [ ] nothing to do per EP, may proceed with cautery use in OR                                       3M nasal swab ordered?  x_Y  _N    Cranial surgery: Order written for hair to be shampooed night before surgery and morning before surgery  [] yes   [x]no  Chlorhexidine Wipes ordered for Neck Down?  x_ Y  _ N  (twice a day if 1 day before surgery, daily for 3 days if 3 days prior, daily if in ICU)                 Assessment:  81 y/o male with h/o DM, HTN, HLD, gout, and anxiety presents transfered with CT findings of multilevel thoracic canal stensis in the setting of LE weakness and bowel/bladder symptoms. Transfered for further management and surgical plan.     NEURO  - Neuro checks q 4/vitals q 4   - MRI w/o contrast @ [facility] 3/25 Multilevel lower thoracic canal stenosis (T4 - Conus)  - Pain control   - PT/OT   - OOB with assistance     CARDIO   - SBP    - HTN: Continue amlodipine, losartan held preop  - Echo @ [facility] 3/27 - moderate aortic valve stenosis, grade II diastolic dysfunction, mild LVH, EF 69%   - cardiology consulted, recs appreciated    PULM   - Maintain SpO2 > 92%  - Encourage IS     GI   - CCD, NPO after midnight for OR  - Bowel regimen, last BM 3/28    RENAL   - NS at 30 whlie NPO  - Urinary retenion: +bravo    ENDO/RHEUM  - ISS   - DM: A1c 6.3, hold home metformin, glipizide   - Gout: Hold home allopurinol, colchicine     HEME   - SCDs   - LE dopplers 3/28 negative    ID   - No issues     DISPO   - Pending OR 3/29  - full code, regional status    D/W Dr. Vargas     Assessment:  Present when checked    []  GCS  E   V  M     Heart Failure: []Acute, [] acute on chronic , []chronic  Heart Failure:  [] Diastolic (HFpEF), [] Systolic (HFrEF), []Combined (HFpEF and HFrEF), [] RHF, [] Pulm HTN, [] Other    [] JEMMA, [] ATN, [] AIN, [] other  [] CKD1, [] CKD2, [] CKD 3, [] CKD 4, [] CKD 5, []ESRD    Encephalopathy: [] Metabolic, [] Hepatic, [] toxic, [] Neurological, [] Other    Abnormal Nurtitional Status: [] malnurtition (see nutrition note), [ ]underweight: BMI < 19, [] morbid obesity: BMI >40, [] Cachexia    [] Sepsis  [] hypovolemic shock,[] cardiogenic shock, [] hemorrhagic shock, [] neuogenic shock  [] Acute Respiratory Failure  []Cerebral edema, [] Brain compression/ herniation,   [] Functional quadriplegia  [] Acute blood loss anemia

## 2023-03-30 NOTE — PROGRESS NOTE ADULT - SUBJECTIVE AND OBJECTIVE BOX
Patient is a 82y old  Male who presents with a chief complaint of Spinal stenosis (28 Mar 2023 01:04)    INTERVAL EVENTS: JOJO    SUBJECTIVE:  Patient was seen and examined at bedside. Patient has no acute complaints. Feels well    Review of systems: Patient denies: fever, chills, dizziness, HA, Changes in vision, CP, dyspnea, nausea or vomiting, dysuria, changes in bowel movements, LE edema. Rest of 12 point Review of systems negative unless otherwise documented elsewhere in note.     Diet, Consistent Carbohydrate w/Evening Snack (03-28-23 @ 03:06) [Active]      Home Medications:  Albuterol (Eqv-ProAir HFA) 90 mcg/inh inhalation aerosol: 2 inhaled every 6 hours as needed for  shortness of breath and/or wheezing (28 Mar 2023 01:24)  allopurinol 300 mg oral tablet: 1 orally once a day (28 Mar 2023 01:16)  amLODIPine 10 mg oral tablet: 1 orally once a day (28 Mar 2023 01:19)  atorvastatin 80 mg oral tablet: 1 orally once a day (28 Mar 2023 01:21)  colchicine 0.6 mg oral tablet: 1 orally 2 times a day (28 Mar 2023 01:16)  D3 50 mcg (2000 intl units) oral capsule: 1 orally once a day (28 Mar 2023 01:28)  gabapentin 300 mg oral tablet: 1 tab(s) orally once a day (at bedtime) (28 Mar 2023 01:22)  glipiZIDE 5 mg oral tablet: 1 orally once a day (28 Mar 2023 01:17)  levothyroxine 25 mcg (0.025 mg) oral capsule: 1 cap(s) orally once a day (28 Mar 2023 01:23)  losartan 50 mg oral tablet: 1 orally once a day (28 Mar 2023 01:19)  MetFORMIN (Eqv-Fortamet) 1000 mg oral tablet, extended release: 1 orally once a day (at bedtime) (28 Mar 2023 01:17)  PARoxetine 30 mg oral tablet: 1 orally once a day (28 Mar 2023 01:25)  Zetia 10 mg oral tablet: 1 orally once a day (28 Mar 2023 01:21)    Social History:  Patient lives with his wife at home in [place], [place]. Retired .  of [employer]. Denies tobacco, alcohol, ilicit drug use. (28 Mar 2023 01:04)    FAMILY HISTORY:  Family history of stroke (Father)    Family history of heart attack (Mother)      MEDICATIONS  (STANDING):  acetaminophen     Tablet .. 1000 milliGRAM(s) Oral once  amLODIPine   Tablet 10 milliGRAM(s) Oral daily  aprepitant 40 milliGRAM(s) Oral once  atorvastatin 80 milliGRAM(s) Oral at bedtime  celecoxib 200 milliGRAM(s) Oral once  furosemide   Injectable 20 milliGRAM(s) IV Push once  gabapentin 300 milliGRAM(s) Oral every 8 hours  insulin lispro (ADMELOG) corrective regimen sliding scale   SubCutaneous Before meals and at bedtime  multivitamin 1 Tablet(s) Oral daily  PARoxetine 30 milliGRAM(s) Oral daily  polyethylene glycol 3350 17 Gram(s) Oral daily  povidone iodine 5% Nasal Swab 1 Application(s) Both Nostrils once  senna 2 Tablet(s) Oral at bedtime  sodium chloride 0.9%. 1000 milliLiter(s) (30 mL/Hr) IV Continuous <Continuous>    MEDICATIONS  (PRN):  acetaminophen     Tablet .. 650 milliGRAM(s) Oral every 6 hours PRN Temp greater or equal to 38C (100.4F), Mild Pain (1 - 3)  albuterol    90 MICROgram(s) HFA Inhaler 2 Puff(s) Inhalation every 6 hours PRN for shortness of breath and/or wheezing  diphenhydrAMINE 50 milliGRAM(s) Oral at bedtime PRN Insomnia  methocarbamol 500 milliGRAM(s) Oral every 8 hours PRN Muscle Spasm  ondansetron Injectable 4 milliGRAM(s) IV Push every 6 hours PRN Nausea and/or Vomiting          HLD (hyperlipidemia)      Gout      Anxiety      DM (diabetes mellitus)      Thoracic spinal stenosis      HTN (hypertension)      HTN (hypertension)      History of carpal tunnel surgery of left wrist      History of prostate surgery          Allergies    niacin (Rash (Mild))  Shrimp (Anaphylaxis)    Intolerances      Vital Signs Last 24 Hrs  T(C): 36.5 (30 Mar 2023 09:16), Max: 36.6 (29 Mar 2023 16:38)  T(F): 97.7 (30 Mar 2023 09:16), Max: 97.9 (29 Mar 2023 21:31)  HR: 90 (30 Mar 2023 09:16) (64 - 90)  BP: 143/69 (30 Mar 2023 09:16) (105/49 - 158/76)  BP(mean): 93 (30 Mar 2023 09:16) (93 - 94)  RR: 17 (30 Mar 2023 09:16) (16 - 18)  SpO2: 95% (30 Mar 2023 09:16) (94% - 97%)    Parameters below as of 30 Mar 2023 09:16  Patient On (Oxygen Delivery Method): room air              PHYSICAL EXAM:  Gen: Reclining in bed at time of exam, appears stated age  HEENT: NCAT, MMM, clear OP  Neck: supple, trachea at midline  CV: RRR, +S1/S2, b/l LE edema pitting + 1 to knees  Pulm: adequate respiratory effort, no increase in work of breathing  Abd: soft, NTND  Skin: warm and dry, no new rashes vs prior report  Ext: WWP, no LE edema  Neuro: AOx3, no gross focal neurological deficits  Psych: affect and behavior appropriate, pleasant at time of interview    LABS:                                   11.5   8.28  )-----------( 463      ( 30 Mar 2023 05:30 )             37.0   03-30    141  |  104  |  27<H>  ----------------------------<  118<H>  4.7   |  24  |  1.10    Ca    9.1      30 Mar 2023 05:30  Phos  3.4     03-30  Mg     2.1     03-30            MICRODATA:      RADIOLOGY/OTHER STUDIES:

## 2023-03-30 NOTE — PROGRESS NOTE ADULT - SUBJECTIVE AND OBJECTIVE BOX
HPI:  83 y/o male with h/o DM, HTN, HLD, Hypothyroidism, gout, and anxiety presents transferred with CT findings of multilevel thoracic canal stenosis in the setting of LE weakness and bowel/bladder symptoms. Presented to [facility] on 2/25 after neurologist, Dr. Dailey recommended CT demonstrating concern for T10 compression. Presents with complaints of progressive worsening of b/l LE weakness and ambulatory disfunction x 3 months (previously ambulatory then requiring a cane, walker, and now unable to ambulate). Describes a tingling sensation in his toes but no loss of sensation in the lower extremities. Also reports dysuria, urinary retention, and saddle anesthesia over the last 2 weeks with intermittent bowel and bladder incontinence. Denies headache, chest pain, palpitations, visual abnormalities, nausea/vomiting, fever, chills, weight loss/gain. Transfered for further management and surgical plan.  (28 Mar 2023 01:04)    OVERNIGHT EVENTS: JOJO o/n. Neuro stable. Preop for OR today.     Hospital Course:  3/28: Transferred from [facility] with spinal stenosis. Bravo placed for persistant urinary retention. Cardiology and medicine consulted for preop clearance. LE dopplers completed.   3/29: JOJO o/n. Anesthesia consulted preop. Losartan held preop.    Vital Signs Last 24 Hrs  T(C): 36.6 (29 Mar 2023 21:31), Max: 36.6 (29 Mar 2023 05:09)  T(F): 97.9 (29 Mar 2023 21:31), Max: 97.9 (29 Mar 2023 05:09)  HR: 64 (29 Mar 2023 21:31) (64 - 75)  BP: 105/49 (29 Mar 2023 21:31) (105/49 - 158/76)  BP(mean): 94 (29 Mar 2023 16:21) (94 - 94)  RR: 16 (29 Mar 2023 21:31) (16 - 18)  SpO2: 97% (29 Mar 2023 21:31) (94% - 97%)    Parameters below as of 29 Mar 2023 21:31  Patient On (Oxygen Delivery Method): room air        I&O's Summary    28 Mar 2023 07:01  -  29 Mar 2023 07:00  --------------------------------------------------------  IN: 480 mL / OUT: 1300 mL / NET: -820 mL    29 Mar 2023 07:01  -  30 Mar 2023 01:36  --------------------------------------------------------  IN: 0 mL / OUT: 400 mL / NET: -400 mL        PHYSICAL EXAM:  General: patient seen laying supine in bed in NAD  Neuro: AAOx3, follows commands, opens eyes spontaneously, speech clear and fluent,  face symmetric, strength 5/5 b/l upper extremities and right lower extremity, LLE HF 4+/5 otherwise 5/5. sensation intact to light touch throughout  HEENT: PERRL  Neck: supple  Cardiac: RRR, S1S2  Pulmonary: chest rise symmetric  Abdomen: soft, nontender, nondistended  Ext: perfusing well  Skin: warm, dry    LABS:                        12.4   7.32  )-----------( 463      ( 29 Mar 2023 07:04 )             38.9     03-29    137  |  105  |  25<H>  ----------------------------<  112<H>  4.7   |  22  |  0.97    Ca    9.5      29 Mar 2023 07:04  Phos  3.7     03-29  Mg     2.1     03-29      PT/INR - ( 29 Mar 2023 07:04 )   PT: 12.2 sec;   INR: 1.02          PTT - ( 29 Mar 2023 07:04 )  PTT:33.4 sec        CAPILLARY BLOOD GLUCOSE      POCT Blood Glucose.: 116 mg/dL (29 Mar 2023 21:48)  POCT Blood Glucose.: 216 mg/dL (29 Mar 2023 16:56)  POCT Blood Glucose.: 120 mg/dL (29 Mar 2023 11:55)  POCT Blood Glucose.: 106 mg/dL (29 Mar 2023 06:50)      Drug Levels: [] N/A    CSF Analysis: [] N/A      Allergies    niacin (Rash (Mild))  Shrimp (Anaphylaxis)    Intolerances      MEDICATIONS:  Antibiotics:    Neuro:  acetaminophen     Tablet .. 650 milliGRAM(s) Oral every 6 hours PRN  acetaminophen     Tablet .. 1000 milliGRAM(s) Oral once  aprepitant 40 milliGRAM(s) Oral once  celecoxib 200 milliGRAM(s) Oral once  diphenhydrAMINE 50 milliGRAM(s) Oral at bedtime PRN  gabapentin 300 milliGRAM(s) Oral every 8 hours  methocarbamol 500 milliGRAM(s) Oral every 8 hours PRN  ondansetron Injectable 4 milliGRAM(s) IV Push every 6 hours PRN  PARoxetine 30 milliGRAM(s) Oral daily    Anticoagulation:    OTHER:  albuterol    90 MICROgram(s) HFA Inhaler 2 Puff(s) Inhalation every 6 hours PRN  amLODIPine   Tablet 10 milliGRAM(s) Oral daily  atorvastatin 80 milliGRAM(s) Oral at bedtime  chlorhexidine 2% Cloths 1 Application(s) Topical every 12 hours  insulin lispro (ADMELOG) corrective regimen sliding scale   SubCutaneous Before meals and at bedtime  polyethylene glycol 3350 17 Gram(s) Oral daily  povidone iodine 5% Nasal Swab 1 Application(s) Both Nostrils once  senna 2 Tablet(s) Oral at bedtime    IVF:  multivitamin 1 Tablet(s) Oral daily  sodium chloride 0.9%. 1000 milliLiter(s) IV Continuous <Continuous>    CULTURES:    RADIOLOGY & ADDITIONAL TESTS:      ASSESSMENT:  83 y/o male with h/o DM, HTN, HLD, gout, and anxiety presents transfered with CT findings of multilevel thoracic canal stensis in the setting of LE weakness and bowel/bladder symptoms. Transfered for further management and surgical plan.     NEURO  - Neuro checks q 4/vitals q 4   - MRI w/o contrast @ [facility] 3/25 Multilevel lower thoracic canal stenosis (T4 - Conus)  - Pain control   - PT/OT   - OOB with assistance     CARDIO   - SBP    - HTN: Continue amlodipine, losartan held preop  - Echo @ [facility] 3/27 - moderate aortic valve stenosis, grade II diastolic dysfunction, mild LVH, EF 69%   - cardiology consulted, recs appreciated  - structural heart consulted, recs appreciated    PULM   - Maintain SpO2 > 92%  - Encourage IS     GI   - CCD, NPO after midnight for OR  - Bowel regimen, last BM 3/28    RENAL   - NS at 30 whlie NPO  - Urinary retenion: +bravo    ENDO/RHEUM  - ISS   - DM: A1c 6.3, hold home metformin, glipizide   - Gout: Hold home allopurinol, colchicine     HEME   - SCDs for DVT ppx  - LE dopplers 3/28 negative    ID   - No issues     DISPO   - Pending OR 3/30  - full code, regional status    D/W Dr. Vargas

## 2023-03-31 NOTE — DISCHARGE NOTE PROVIDER - NSDCFUSCHEDAPPT_GEN_ALL_CORE_FT
Ben Garvin  [facility]  [facility] [address]  Scheduled Appointment: 04/13/2023

## 2023-03-31 NOTE — PHYSICAL THERAPY INITIAL EVALUATION ADULT - ADDITIONAL COMMENTS
Household ambulator who lives with his wife and grandchild (46 yo) in private house, no TONJA as patient has ramp and enters through sliding door in the back. Ambulates with standard walker and assist from wife and also has assist from wife and adult grandchildren for all ADLs.

## 2023-03-31 NOTE — OCCUPATIONAL THERAPY INITIAL EVALUATION ADULT - PERTINENT HX OF CURRENT PROBLEM, REHAB EVAL
83 y/o male with h/o DM, HTN, HLD, gout, and anxiety presents transfered with CT findings of multilevel thoracic canal stensis in the setting of LE weakness and bowel/bladder symptoms. Transfered for further management and surgical plan. Now, s/p T9-11 lami/fusion (3/30).

## 2023-03-31 NOTE — DISCHARGE NOTE PROVIDER - HOSPITAL COURSE
HPI:  81 y/o male with h/o DM, HTN, HLD, Hypothyroidism, gout, and anxiety presents transferred with CT findings of multilevel thoracic canal stenosis in the setting of LE weakness and bowel/bladder symptoms. Presented to [place] on 2/25 after neurologist, Dr. Dailey recommended CT demonstrating concern for T10 compression. Presents with complaints of progressive worsening of b/l LE weakness and ambulatory disfunction x 3 months (previously ambulatory then requiring a cane, walker, and now unable to ambulate). Describes a tingling sensation in his toes but no loss of sensation in the lower extremities. Also reports dysuria, urinary retention, and saddle anesthesia over the last 2 weeks with intermittent bowel and bladder incontinence. Denies headache, chest pain, palpitations, visual abnormalities, nausea/vomiting, fever, chills, weight loss/gain. Transferred for further management and surgical plan.     Hospital Course:    Patient evaluated by PT/OT who recommend:  Patient is going home? rehab? hospice? Facility Name:     Hospital course c/b:     Exam on day of discharge:    Checklist:   - Obtained follow up appointment from NP  - Reviewed final recommendations of inpatient consults  - post op imaging completed  - Neurologically stable for discharge  - Vitals stable for discharge   - Afebrile for discharge  - WBC is stable  - Sodium level is normal  - Pain is adequately controlled  - Pt has PICC/walker/brace/collar        HPI:  83 y/o male with h/o DM, HTN, HLD, Hypothyroidism, gout, and anxiety presents transferred with CT findings of multilevel thoracic canal stenosis in the setting of LE weakness and bowel/bladder symptoms. Presented to [place] on 2/25 after neurologist, Dr. Dailey recommended CT demonstrating concern for T10 compression. Presents with complaints of progressive worsening of b/l LE weakness and ambulatory disfunction x 3 months (previously ambulatory then requiring a cane, walker, and now unable to ambulate). Describes a tingling sensation in his toes but no loss of sensation in the lower extremities. Also reports dysuria, urinary retention, and saddle anesthesia over the last 2 weeks with intermittent bowel and bladder incontinence. Denies headache, chest pain, palpitations, visual abnormalities, nausea/vomiting, fever, chills, weight loss/gain. Transferred for further management and surgical plan.     Hospital Course:  3/28: Transferred from [place] with spinal stenosis. Maciel placed for persistant urinary retention. Cardiology and medicine consulted for preop clearance. LE dopplers completed.   3/29: JOJO o/n. Anesthesia consulted preop. Losartan held preop.  3/30: JOJO o/n. Neuro stable. Preop for OR today. No preop lasix per anesthesiology. Started ppx SQL.   3/31: POD 1.  JOJO o/n. Pending post op imaging. Failed TOV. Straight cath prn.  4/1: POD2. JOJO o/n. Neuro stable.     Patient evaluated by PT/OT who recommend: Home PT/OT  Patient is going home    Hospital course c/b: uncomplicated     Exam on day of discharge:  Constitutional: NAD, well groomed, well nourished  Respiratory: breathing non-labored, symmetrical chest wall movement  Cardiovascuar: RRR, no murmurs  Gastrointestinal: abdomen soft, non tender  Genitourinary: exam deffered  Neurological:  AAOX3. Face symmetric, Verbal function intact, speech clear  Cranial Nerves: II-XII intact  Motor: 5/5 power in b/l upper extremities, lower extremities anti-gravity  Sensation: intact to light touch in all extremities  Extremities: distal pulses 2+ x4  Wound/incision: posterior midline thoracic incision with dressing in place C/D/I.    The patient is neurologically stable. Labs/vitals stable. Pain well controlled.        HPI:  83 y/o male with h/o DM, HTN, HLD, Hypothyroidism, gout, and anxiety presents transferred with CT findings of multilevel thoracic canal stenosis in the setting of LE weakness and bowel/bladder symptoms. Presented to [place] on 2/25 after neurologist, Dr. Dailey recommended CT demonstrating concern for T10 compression. Presents with complaints of progressive worsening of b/l LE weakness and ambulatory disfunction x 3 months (previously ambulatory then requiring a cane, walker, and now unable to ambulate). Describes a tingling sensation in his toes but no loss of sensation in the lower extremities. Also reports dysuria, urinary retention, and saddle anesthesia over the last 2 weeks with intermittent bowel and bladder incontinence. Denies headache, chest pain, palpitations, visual abnormalities, nausea/vomiting, fever, chills, weight loss/gain. Transferred for further management and surgical plan.     Hospital Course:  3/28: Transferred from [place] with spinal stenosis. Maciel placed for persistant urinary retention. Cardiology and medicine consulted for preop clearance. LE dopplers completed.   3/29: JOJO o/n. Anesthesia consulted preop. Losartan held preop.  3/30: JOJO o/n. Neuro stable. Preop for OR today. No preop lasix per anesthesiology. Started ppx SQL.   3/31: POD 1.  JOJO o/n. Pending post op imaging. Failed TOV. Straight cath prn.  4/1: POD2. JOJO o/n. Neuro stable.     Patient evaluated by PT/OT who recommend: Home PT/OT  Patient is going home    Hospital course c/b: retaining urine post op requiring straight catheterization     Exam on day of discharge:  Constitutional: NAD, well groomed, well nourished  Respiratory: breathing non-labored, symmetrical chest wall movement  Cardiovascuar: RRR, no murmurs  Gastrointestinal: abdomen soft, non tender  Genitourinary: exam deffered  Neurological:  AAOX3. Face symmetric, Verbal function intact, speech clear  Cranial Nerves: II-XII intact  Motor: 5/5 power in b/l upper extremities, lower extremities anti-gravity  Sensation: intact to light touch in all extremities  Extremities: distal pulses 2+ x4  Wound/incision: posterior midline thoracic incision with dressing in place C/D/I.    The patient is neurologically stable. Labs/vitals stable. Pain well controlled.        HPI:  81 y/o male with h/o DM, HTN, HLD, Hypothyroidism, gout, and anxiety presents transferred with CT findings of multilevel thoracic canal stenosis in the setting of LE weakness and bowel/bladder symptoms. Presented to [place] on 2/25 after neurologist, Dr. Dailey recommended CT demonstrating concern for T10 compression. Presents with complaints of progressive worsening of b/l LE weakness and ambulatory disfunction x 3 months (previously ambulatory then requiring a cane, walker, and now unable to ambulate). Describes a tingling sensation in his toes but no loss of sensation in the lower extremities. Also reports dysuria, urinary retention, and saddle anesthesia over the last 2 weeks with intermittent bowel and bladder incontinence. Denies headache, chest pain, palpitations, visual abnormalities, nausea/vomiting, fever, chills, weight loss/gain. Transferred for further management and surgical plan. Now, S/P T9-11 lami/fusion (3/30).     Hospital Course:  3/28: Transferred from [place] with spinal stenosis. Maciel placed for persistant urinary retention. Cardiology and medicine consulted for preop clearance. LE dopplers completed.   3/29: JOJO o/n. Anesthesia consulted preop. Losartan held preop.  3/30: JOJO o/n. Neuro stable. Preop for OR today. No preop lasix per anesthesiology. Started ppx SQL.   3/31: POD 1.  JOJO o/n. Pending post op imaging. Failed TOV. Straight cath prn.  4/1: POD2. JOJO o/n. Neuro stable.     Patient evaluated by PT/OT who recommend: Home PT/OT  Patient is going home    Hospital course c/b: retaining urine post op requiring straight catheterization     Exam on day of discharge:  Constitutional: NAD, well groomed, well nourished  Respiratory: breathing non-labored, symmetrical chest wall movement  Cardiovascuar: RRR, no murmurs  Gastrointestinal: abdomen soft, non tender  Genitourinary: exam deffered  Neurological:  AAOX3. Face symmetric, Verbal function intact, speech clear  Cranial Nerves: II-XII intact  Motor: 5/5 power in b/l upper extremities, lower extremities anti-gravity  Sensation: intact to light touch in all extremities  Extremities: distal pulses 2+ x4  Wound/incision: posterior midline thoracic incision with dressing in place C/D/I.    The patient is neurologically stable. Labs/vitals stable. Pain well controlled.        HPI:  83 y/o male with h/o DM, HTN, HLD, Hypothyroidism, gout, and anxiety presents transferred with CT findings of multilevel thoracic canal stenosis in the setting of LE weakness and bowel/bladder symptoms. Presented to [place] on 2/25 after neurologist, Dr. Dailey recommended CT demonstrating concern for T10 compression. Presents with complaints of progressive worsening of b/l LE weakness and ambulatory disfunction x 3 months (previously ambulatory then requiring a cane, walker, and now unable to ambulate). Describes a tingling sensation in his toes but no loss of sensation in the lower extremities. Also reports dysuria, urinary retention, and saddle anesthesia over the last 2 weeks with intermittent bowel and bladder incontinence. Denies headache, chest pain, palpitations, visual abnormalities, nausea/vomiting, fever, chills, weight loss/gain. Transferred for further management and surgical plan. Now, S/P T9-11 lami/fusion (3/30).     Hospital Course:  3/28: Transferred from [place] with spinal stenosis. Bravo placed for persistant urinary retention. Cardiology and medicine consulted for preop clearance. LE dopplers completed.   3/29: JOJO o/n. Anesthesia consulted preop. Losartan held preop.  3/30: JOJO o/n. Neuro stable. Preop for OR today. No preop lasix per anesthesiology. Started ppx SQL.   3/31: POD 1.  JOJO o/n. Pending post op imaging. Failed TOV. Straight cath prn.  4/1: POD2. JOJO o/n. Neuro stable.   4/2: POD3. JOJO overnight. Neuro stable.     Patient evaluated by PT/OT who recommend: Home PT/OT (Prescription was provided on discharge), patient has rolling walker at home   Patient is going home    Hospital course c/b: retaining urine post op requiring bravo catheterization    Exam on day of discharge:  Constitutional: NAD, well groomed, well nourished  Respiratory: breathing non-labored, symmetrical chest wall movement  Cardiovascuar: RRR, no murmurs  Gastrointestinal: abdomen soft, non tender  Genitourinary: exam deffered  Neurological:  AAOX3. Face symmetric, Verbal function intact, speech clear  Cranial Nerves: II-XII intact  Motor: 5/5 power in b/l upper extremities, lower extremities anti-gravity  Sensation: intact to light touch in all extremities  Extremities: distal pulses 2+ x4  Wound/incision: posterior midline thoracic incision with dressing in place C/D/I.    The patient is neurologically stable. Labs/vitals stable. Pain well controlled.

## 2023-03-31 NOTE — DISCHARGE NOTE PROVIDER - PROVIDER TOKENS
PROVIDER:[TOKEN:[[id number]]],PROVIDER:[TOKEN:[[id number]]],PROVIDER:[TOKEN:[[id number]]] PROVIDER:[TOKEN:[[id number]]],PROVIDER:[TOKEN:[[id number]]],PROVIDER:[TOKEN:[[id number]]],PROVIDER:[TOKEN:[[id number]]]

## 2023-03-31 NOTE — DISCHARGE NOTE PROVIDER - NSDCCPCAREPLAN_GEN_ALL_CORE_FT
PRINCIPAL DISCHARGE DIAGNOSIS  Diagnosis: Thoracic spinal stenosis  Assessment and Plan of Treatment:       SECONDARY DISCHARGE DIAGNOSES  Diagnosis: DM (diabetes mellitus)  Assessment and Plan of Treatment:     Diagnosis: HTN (hypertension)  Assessment and Plan of Treatment:     Diagnosis: Gout  Assessment and Plan of Treatment:     Diagnosis: Anxiety  Assessment and Plan of Treatment:     Diagnosis: HLD (hyperlipidemia)  Assessment and Plan of Treatment:

## 2023-03-31 NOTE — PROGRESS NOTE ADULT - SUBJECTIVE AND OBJECTIVE BOX
Cardiology Consult    O/N:  Interval History: patient was seen and examined in the pacu. feeling well. no complains.       OBJECTIVE  Vitals:  T(C): 36.9 (03-31-23 @ 15:05), Max: 36.9 (03-31-23 @ 15:05)  HR: 80 (03-31-23 @ 15:05) (60 - 81)  BP: 146/56 (03-31-23 @ 15:05) (108/53 - 146/56)  RR: 18 (03-31-23 @ 15:05) (1 - 23)  SpO2: 94% (03-31-23 @ 15:05) (91% - 100%)  Wt(kg): --    I/O:  I&O's Summary    30 Mar 2023 07:01  -  31 Mar 2023 07:00  --------------------------------------------------------  IN: 2415 mL / OUT: 1915 mL / NET: 500 mL    31 Mar 2023 07:01  -  31 Mar 2023 16:10  --------------------------------------------------------  IN: 670 mL / OUT: 60 mL / NET: 610 mL        PHYSICAL EXAM:  GEN: Awake, comfortable. NAD. sitting in a chair eating   HEENT: NCAT  RESP: CTA b/l  CV: RRR, normal s1/s2. systolic murmur   EXT: Warm. No edema  NEURO: AAOx3.      LABS:                        10.5   12.04 )-----------( 400      ( 31 Mar 2023 04:32 )             32.3     03-31    137  |  105  |  23  ----------------------------<  194<H>  4.7   |  24  |  0.98    Ca    8.1<L>      31 Mar 2023 04:32  Phos  4.3     03-31  Mg     1.8     03-31            RADIOLOGY & ADDITIONAL TESTS:  Reviewed .    MEDICATIONS  (STANDING):  acetaminophen     Tablet .. 1000 milliGRAM(s) Oral every 8 hours  amLODIPine   Tablet 10 milliGRAM(s) Oral daily  atorvastatin 80 milliGRAM(s) Oral at bedtime  enoxaparin Injectable 40 milliGRAM(s) SubCutaneous every 24 hours  gabapentin 300 milliGRAM(s) Oral every 8 hours  insulin lispro (ADMELOG) corrective regimen sliding scale   SubCutaneous Before meals and at bedtime  losartan 50 milliGRAM(s) Oral daily  methocarbamol 500 milliGRAM(s) Oral every 8 hours  multivitamin 1 Tablet(s) Oral daily  ondansetron   Disintegrating Tablet 4 milliGRAM(s) Oral every 6 hours  PARoxetine 30 milliGRAM(s) Oral daily  polyethylene glycol 3350 17 Gram(s) Oral daily  senna 2 Tablet(s) Oral at bedtime    MEDICATIONS  (PRN):  albuterol    90 MICROgram(s) HFA Inhaler 2 Puff(s) Inhalation every 6 hours PRN for shortness of breath and/or wheezing  diphenhydrAMINE 50 milliGRAM(s) Oral at bedtime PRN Insomnia  ondansetron Injectable 4 milliGRAM(s) IV Push every 6 hours PRN Nausea and/or Vomiting  oxyCODONE    IR 5 milliGRAM(s) Oral every 4 hours PRN Severe Pain (7 - 10)

## 2023-03-31 NOTE — OCCUPATIONAL THERAPY INITIAL EVALUATION ADULT - GENERAL OBSERVATIONS, REHAB EVAL
Patient wife present. Patient A&Ox4, agreeable and tolerated session fairly. Patient received seated at EOB +heplock IV, +hemovac, +thoracic spinal bandage C/D/I, +room air, NAD.

## 2023-03-31 NOTE — DISCHARGE NOTE PROVIDER - NSFOLLOWUPCLINICS_GEN_ALL_ED_FT
[facility]rology [facility]  Urology  [address], [address]  [place], [place] [address]  Phone: [phone]  Fax:

## 2023-03-31 NOTE — OCCUPATIONAL THERAPY INITIAL EVALUATION ADULT - NSBATHINGEQUIP_GEN_A_OT
patient and patient wife educated on use and setup- able to purchase themselves on [employer]/transfer tub bench

## 2023-03-31 NOTE — DISCHARGE NOTE PROVIDER - NSDCFUADDINST_GEN_ALL_CORE_FT
Neurosurgery follow up appointment date/time: 4/13/23 10 AM with Ben   - are staples/sutures in place?  - what day should staples/sutures be removed (POD 10-14)?  - please call the office to confirm appointment: [phone]     Wound Care:  - can patient shower?  - does dressing need to be changed/removed?    Devices:  - does patient need collar or brace?  - does collar/brace need to be worn at all times or just when OOB?    Drains/Lines:  - PICC in place? ID follow up? (Paper Rx for: antibiotics, heparin flush, weekly lab draws)  - JACQUELINE in place? Management?  - bravo in place? Management/Urology follow up?     Activity:  - fatigue is common after surgery, rest if you feel tired   - no bending, lifting, twisting or heavy lifting   - walking is recommended, ambulate as tolerated  - you may shower when you get home, keep your incision dry  - no bathing   - no driving within 24 hours of anesthesia or while taking prescription pain medications   - keep hydrated, drink plenty of water   - skullbase precautions: no nose blowing, sneeze with mouth open, no drinking out of a straw, no straining      Inpatient consults:  - final recommendations  - you will need follow up with....    Please also follow up with your primary care doctor.     Pain Expectations:  - pain after surgery is expected  - please take pain meds as prescribed     Medications:  - changes to home meds (ex. AED's)?  - new meds?  - pain meds?  - when can antiplatelets or antocoagulants be restarted?  - were adverse affects of meds discussed with patients?   - pain medications can cause constipation, you should eat a high fiber diet and may take a stool softener while on pain meds   - Avoid taking Advil (ibuprofen), Motrin (naproxen), or Aspirin for pain as they can cause bleeding     Call the office or come to ED if:  - wound has drainage or bleeding, increased redness or pain at incision site, neurological change, fever (>101), chills, night sweats, syncope, nausea/vomiting      Playback:  - are discharge instruction on playback?  - is a picture of the incision on playback?     WITHIN 24 HOURS OF DISCHARGE, PLEASE CONTACT NEURO PA  WITH ANY QUESTIONS OR CONCERNS: [phone]   OTHERWISE, PLEASE CALL THE OFFICE WITH ANY QUESTIONS OR CONCERNS: [phone] Neurosurgery follow up appointment date/time: 4/13/23 10 AM with Ben   - are staples/sutures in place?  - what day should staples/sutures be removed (POD 10-14)?  - please call the office to confirm appointment: [phone]     Wound Care:  - you can shower   - leave incision open to air     Activity:  - fatigue is common after surgery, rest if you feel tired   - no bending, lifting, twisting or heavy lifting   - walking is recommended, ambulate as tolerated  - you may shower when you get home, keep your incision clean/dry  - no baths, swimming pools, hot tubs   - no driving within 24 hours of anesthesia or while taking prescription pain medications   - keep hydrated, drink plenty of water       Please also follow up with your primary care doctor.     Inpatient Consults:  - follow up with pain management, Dr. Yang  - follow up with cardiology/structural heart for moderate-to-severe aortic stenosis and grade 1 left ventricular diastolic dysfunction    Pain Expectations:  - pain after surgery is expected  - please take pain medications as prescribed     Medications:  - you can continue all of your home medications as prescribed  -   - when can antiplatelets or antocoagulants be restarted?  - were adverse affects of meds discussed with patients?   - pain medications can cause constipation, you should eat a high fiber diet and may take a stool softener while on pain meds   - Avoid taking Advil (ibuprofen), Motrin (naproxen), or Aspirin for pain as they can cause bleeding     Call the office or come to ED if:  - wound has drainage or bleeding, increased redness or pain at incision site, neurological change, fever (>101), chills, night sweats, syncope, nausea/vomiting      Playback:  - are discharge instruction on playback?  - is a picture of the incision on playback?     WITHIN 24 HOURS OF DISCHARGE, PLEASE CONTACT NEURO PA  WITH ANY QUESTIONS OR CONCERNS: [phone]   OTHERWISE, PLEASE CALL THE OFFICE WITH ANY QUESTIONS OR CONCERNS: [phone] Neurosurgery follow up appointment date/time: 4/13/23 10 AM with Ben   - are staples/sutures in place?  - what day should staples/sutures be removed (POD 10-14)?  - please call the office to confirm appointment: [phone]     Wound Care:  - you can shower   - leave incision open to air     Activity:  - fatigue is common after surgery, rest if you feel tired   - no bending, lifting, twisting or heavy lifting   - walking is recommended, ambulate as tolerated  - you may shower when you get home, keep your incision clean/dry  - no baths, swimming pools, hot tubs   - no driving within 24 hours of anesthesia or while taking prescription pain medications   - keep hydrated, drink plenty of water     Please also follow up with your primary care doctor.     Inpatient Consults:  - follow up with pain management, Dr. Yang  - follow up with cardiology/structural heart for moderate-to-severe aortic stenosis and grade 1 left ventricular diastolic dysfunction    Pain Expectations:  - pain after surgery is expected  - please take pain medications as prescribed     Medications:  - you can continue all of your home medications as prescribed  - you were prescribed oxycodone as needed for severe pain, gabapentin for nerve related pain, robaxin as needed for muscle spasms, bowel regimen as needed for constipation, you may supplement with extra-strength tylenol for mild pain if needed   - adverse effects of medications were discussed   - pain medications can cause constipation, you should eat a high fiber diet and may take a stool softener while on pain meds   - Avoid taking Advil (ibuprofen), Motrin (naproxen), or Aspirin for pain as they can cause bleeding     Call the office or come to ED if:  - wound has drainage or bleeding, increased redness or pain at incision site, neurological change, fever (>101), chills, night sweats, syncope, nausea/vomiting      Playback:  - discharge instructions uploaded to playback     WITHIN 24 HOURS OF DISCHARGE, PLEASE CONTACT NEURO PA  WITH ANY QUESTIONS OR CONCERNS: [phone]   OTHERWISE, PLEASE CALL THE OFFICE WITH ANY QUESTIONS OR CONCERNS: [phone] Neurosurgery follow up appointment date/time: 4/13/23 10 AM with Ben   - you have internal stiches in place that do not need to be removed   - please call the office to confirm appointment: [phone]     Wound Care:  - you can shower   - leave incision open to air     Devices:  - bravo catheter placed 4/2/23 for failed trial of void     Activity:  - fatigue is common after surgery, rest if you feel tired   - no bending, lifting, twisting or heavy lifting   - walking is recommended, ambulate as tolerated  - you may shower when you get home, keep your incision clean/dry  - no baths, swimming pools, hot tubs   - no driving within 24 hours of anesthesia or while taking prescription pain medications   - keep hydrated, drink plenty of water     Please also follow up with your primary care doctor.     Inpatient Consults:  - follow up with pain management, Dr. Yang  - follow up with cardiology/structural heart for moderate-to-severe aortic stenosis and grade 1 left ventricular diastolic dysfunction  - follow up with urology for the bravo catheter that was placed prior to your discharge for urinary rentention     Pain Expectations:  - pain after surgery is expected  - please take pain medications as prescribed     Medications:  - you can continue all of your home medications as prescribed  - you were prescribed oxycodone as needed for severe pain, gabapentin for nerve related pain, robaxin as needed for muscle spasms, bowel regimen as needed for constipation, you may supplement with extra-strength tylenol for mild pain if needed   - adverse effects of medications were discussed   - pain medications can cause constipation, you should eat a high fiber diet and may take a stool softener while on pain meds   - Avoid taking Advil (ibuprofen), Motrin (naproxen), or Aspirin for pain as they can cause bleeding     Call the office or come to ED if:  - wound has drainage or bleeding, increased redness or pain at incision site, neurological change, fever (>101), chills, night sweats, syncope, nausea/vomiting      Playback:  - discharge instructions uploaded to playback     WITHIN 24 HOURS OF DISCHARGE, PLEASE CONTACT NEURO PA  WITH ANY QUESTIONS OR CONCERNS: [phone]   OTHERWISE, PLEASE CALL THE OFFICE WITH ANY QUESTIONS OR CONCERNS: [phone] Neurosurgery follow up appointment date/time: 4/13/23 10 AM with Ben   - you have internal stiches in place that do not need to be removed   - please call the office to confirm appointment: [phone]     Wound Care:  - you can shower   - leave incision open to air     Devices:  - 14fr bravo catheter placed 4/2/23 for failed trial of void         Activity:  - fatigue is common after surgery, rest if you feel tired   - no bending, lifting, twisting or heavy lifting   - walking is recommended, ambulate as tolerated  - you may shower when you get home, keep your incision clean/dry  - no baths, swimming pools, hot tubs   - no driving within 24 hours of anesthesia or while taking prescription pain medications   - keep hydrated, drink plenty of water     Please also follow up with your primary care doctor.     Inpatient Consults:  - follow up with pain management, Dr. Yang  - follow up with cardiology/structural heart for moderate-to-severe aortic stenosis and grade 1 left ventricular diastolic dysfunction  - follow up with urology for the bravo catheter that was placed prior to your discharge for urinary rentention     Pain Expectations:  - pain after surgery is expected  - please take pain medications as prescribed     Medications:  - you can continue all of your home medications as prescribed  - you were prescribed oxycodone as needed for severe pain, gabapentin for nerve related pain, robaxin as needed for muscle spasms, bowel regimen as needed for constipation, you may supplement with extra-strength tylenol for mild pain if needed   - adverse effects of medications were discussed   - pain medications can cause constipation, you should eat a high fiber diet and may take a stool softener while on pain meds   - Avoid taking Advil (ibuprofen), Motrin (naproxen), or Aspirin for pain as they can cause bleeding     Call the office or come to ED if:  - wound has drainage or bleeding, increased redness or pain at incision site, neurological change, fever (>101), chills, night sweats, syncope, nausea/vomiting      Playback:  - discharge instructions uploaded to playback     WITHIN 24 HOURS OF DISCHARGE, PLEASE CONTACT NEURO PA  WITH ANY QUESTIONS OR CONCERNS: [phone]   OTHERWISE, PLEASE CALL THE OFFICE WITH ANY QUESTIONS OR CONCERNS: [phone] Neurosurgery follow up appointment date/time: 4/13/23 10 AM with Ben   - you have internal stiches in place that do not need to be removed   - please call the office to confirm appointment: [phone]     Wound Care:  - you can shower   - leave incision open to air     Devices:  - 14fr bravo catheter placed 4/2/23 for failed trial of void  - bravo discharge instructions were provided      Activity:  - fatigue is common after surgery, rest if you feel tired   - no bending, lifting, twisting or heavy lifting   - walking is recommended, ambulate as tolerated  - you may shower when you get home, keep your incision clean/dry  - no baths, swimming pools, hot tubs   - no driving within 24 hours of anesthesia or while taking prescription pain medications   - keep hydrated, drink plenty of water     Please also follow up with your primary care doctor.     Inpatient Consults:  - follow up with pain management, Dr. Yang  - follow up with cardiology/structural heart for moderate-to-severe aortic stenosis and grade 1 left ventricular diastolic dysfunction  - follow up with urology for the bravo catheter that was placed prior to your discharge for urinary rentention     Pain Expectations:  - pain after surgery is expected  - please take pain medications as prescribed     Medications:  - you can continue all of your home medications as prescribed  - you were prescribed oxycodone as needed for severe pain, gabapentin for nerve related pain, robaxin as needed for muscle spasms, bowel regimen as needed for constipation, you may supplement with extra-strength tylenol for mild pain if needed   - adverse effects of medications were discussed   - pain medications can cause constipation, you should eat a high fiber diet and may take a stool softener while on pain meds   - Avoid taking Advil (ibuprofen), Motrin (naproxen), or Aspirin for pain as they can cause bleeding     Call the office or come to ED if:  - wound has drainage or bleeding, increased redness or pain at incision site, neurological change, fever (>101), chills, night sweats, syncope, nausea/vomiting      Playback:  - discharge instructions uploaded to playback     WITHIN 24 HOURS OF DISCHARGE, PLEASE CONTACT NEURO PA  WITH ANY QUESTIONS OR CONCERNS: [phone]   OTHERWISE, PLEASE CALL THE OFFICE WITH ANY QUESTIONS OR CONCERNS: [phone]

## 2023-03-31 NOTE — PROGRESS NOTE ADULT - SUBJECTIVE AND OBJECTIVE BOX
NEUROSURGERY PAIN MANAGEMENT CONSULT NOTE    Chief Complaint: b/l lower extremity weakness    HPI:  81 y/o male with h/o DM, HTN, HLD, Hypothyroidism, gout, and anxiety presents transferred with CT findings of multilevel thoracic canal stenosis in the setting of LE weakness and bowel/bladder symptoms. Presented to [facility] on 2/25 after neurologist, Dr. Dailey recommended CT demonstrating concern for T10 compression. Presents with complaints of progressive worsening of b/l LE weakness and ambulatory disfunction x 3 months (previously ambulatory then requiring a cane, walker, and now unable to ambulate). Describes a tingling sensation in his toes but no loss of sensation in the lower extremities. Also reports dysuria, urinary retention, and saddle anesthesia over the last 2 weeks with intermittent bowel and bladder incontinence. Denies headache, chest pain, palpitations, visual abnormalities, nausea/vomiting, fever, chills, weight loss/gain. Transfered for further management and surgical plan.  (28 Mar 2023 01:04)      PAST MEDICAL & SURGICAL HISTORY:  HTN (hypertension)      HLD (hyperlipidemia)      Gout      Anxiety      DM (diabetes mellitus)      Thoracic spinal stenosis      HTN (hypertension)      HTN (hypertension)      History of carpal tunnel surgery of left wrist      History of prostate surgery          FAMILY HISTORY:  Family history of stroke (Father)    Family history of heart attack (Mother)        SOCIAL HISTORY:  [ ] Denies Smoking, Alcohol, or Drug Use    HOME MEDICATIONS:   Please refer to initial HNP    PAIN HOME MEDICATIONS:    Allergies    niacin (Rash (Mild))  Shrimp (Anaphylaxis)    Intolerances        PAIN MEDICATIONS:  acetaminophen     Tablet .. 1000 milliGRAM(s) Oral every 8 hours  diphenhydrAMINE 50 milliGRAM(s) Oral at bedtime PRN  gabapentin 300 milliGRAM(s) Oral every 8 hours  methocarbamol 500 milliGRAM(s) Oral every 8 hours  ondansetron   Disintegrating Tablet 4 milliGRAM(s) Oral every 6 hours  ondansetron Injectable 4 milliGRAM(s) IV Push every 6 hours PRN  oxyCODONE    IR 5 milliGRAM(s) Oral every 4 hours PRN  PARoxetine 30 milliGRAM(s) Oral daily    Heme:  enoxaparin Injectable 40 milliGRAM(s) SubCutaneous every 24 hours    Antibiotics:    Cardiovascular:  amLODIPine   Tablet 10 milliGRAM(s) Oral daily  losartan 50 milliGRAM(s) Oral daily    GI:  polyethylene glycol 3350 17 Gram(s) Oral daily  senna 2 Tablet(s) Oral at bedtime    Endocrine:  atorvastatin 80 milliGRAM(s) Oral at bedtime  insulin lispro (ADMELOG) corrective regimen sliding scale   SubCutaneous Before meals and at bedtime    All Other Medications:  multivitamin 1 Tablet(s) Oral daily  sodium chloride 0.9%. 1000 milliLiter(s) IV Continuous <Continuous>      Vital Signs Last 24 Hrs  T(C): 36.2 (31 Mar 2023 11:20), Max: 36.4 (30 Mar 2023 21:40)  T(F): 97.1 (31 Mar 2023 11:20), Max: 97.6 (30 Mar 2023 21:40)  HR: 66 (31 Mar 2023 12:00) (60 - 81)  BP: 126/79 (31 Mar 2023 12:00) (108/53 - 142/65)  BP(mean): 72 (31 Mar 2023 12:00) (72 - 93)  RR: 20 (31 Mar 2023 12:00) (1 - 23)  SpO2: 97% (31 Mar 2023 12:00) (91% - 100%)    Parameters below as of 31 Mar 2023 11:45  Patient On (Oxygen Delivery Method): room air        LABS:                        10.5   12.04 )-----------( 400      ( 31 Mar 2023 04:32 )             32.3     03-31    137  |  105  |  23  ----------------------------<  194<H>  4.7   |  24  |  0.98    Ca    8.1<L>      31 Mar 2023 04:32  Phos  4.3     03-31  Mg     1.8     03-31            RADIOLOGY:    Drug Screen:        REVIEW OF SYSTEMS:  CONSTITUTIONAL: No fever or fatigue O/N.   EYES: No eye pain, visual disturbances  ENMT:  No difficulty hearing. No throat pain  NECK: No pain or stiffness  RESPIRATORY: No cough, wheezing; No shortness of breath  CARDIOVASCULAR: No chest pain, palpitations.   GASTROINTESTINAL: Pt reports passing gas. No bowel movements. No abdominal or epigastric pain. No nausea, vomiting. GENITOURINARY: No dysuria, frequency, or incontinence  NEUROLOGICAL: No headaches, loss of strength, numbness, or tremors. No dizzinesss or lightheadedness with pain medications.   MUSCULOSKELETAL: Incisional back pain. No joint pain or swelling; No muscle, or extremity pain    PAIN ASSESSMENT: pain well controlled    PHYSICAL EXAM  GENERAL: Laying in bed, NAD  Neuro: CN II-XII PERRRL, EOMI  Cranial nerves grossly intact  Motor exam: MAEx4  Sensation intact to LT in UE/LE in 3 dermatomes  CHEST/LUNG: Clear to auscultation bilaterally; No rales, rhonchi, wheezing, or rubs  HEART: Regular rate and rhythm; No murmurs, rubs, or gallops  ABDOMEN: Soft, Nontender, Nondistended; Bowel sounds present  EXTREMITIES:  2+ Peripheral Pulses, No clubbing, cyanosis, or edema  SKIN: No rashes or lesions      ASSESSMENT:   81 y/o male with h/o DM, HTN, HLD, gout, and anxiety presents transfered with CT findings of multilevel thoracic canal stensis in the setting of LE weakness and bowel/bladder symptoms. Transfered for further management and surgical plan. Now, s/p T9-11 lami/fusion (3/30).     PLAN:   - Pain:  Tylenol 1000mg every 8 hours  Gabapentin 300mg every 8 hours  Robaxin 500mg every 8 hours  Oxycodone 5mg every 4 hours as needed for severe pain      - Bowel regimen: Senna    - Nausea ppx: Zofran standing  - Functional Goals: Pt will get OOB with PT today. Pt will resume previous level of activity without impairment from surgery.   - Additional Consults: None recommended.   - Additional Labs/Imaging:  None recommended.   - Follow up, Discharge Planning: home when ready   - Pain Management follow up plan: will continue to follow    d/w Dr. Yang

## 2023-03-31 NOTE — DISCHARGE NOTE PROVIDER - NSDCFUADDAPPT_GEN_ALL_CORE_FT
Please follow up with Dr. Stanley on 4/13/23. Call the office to confirm appointment date/time    Follow up with your primary care doctor Please follow up with Dr. Stanley on 4/13/23. Call the office to confirm appointment date/time    Please follow up with Urology for urinary rentention/bravo placement    Follow up with your primary care doctor Please follow up with Dr. Stanley on 4/13/23. Call the office to confirm appointment date/time

## 2023-03-31 NOTE — OCCUPATIONAL THERAPY INITIAL EVALUATION ADULT - ADDITIONAL COMMENTS
Patient reports living with his wife and grandson in a private home with no TONJA- ramp with sliding doors in the back. Patient states he required assistance with most ADL's and functional mobility with standard walker from wife and utilizes a rollator in the community. Patient is L hand dominant has a bathtub shower and has grab bars in the bathroom for toileting with elevated toilet seat.

## 2023-03-31 NOTE — PROGRESS NOTE ADULT - SUBJECTIVE AND OBJECTIVE BOX
Patient is a 82y old  Male who presents with a chief complaint of Spinal stenosis (28 Mar 2023 01:04)    INTERVAL EVENTS: s/p OR    SUBJECTIVE:  Patient was seen and examined at bedside. Patient has no acute complaints. Feels well    Review of systems: Patient denies: fever, chills, dizziness, HA, Changes in vision, CP, dyspnea, nausea or vomiting, dysuria, changes in bowel movements, LE edema. Rest of 12 point Review of systems negative unless otherwise documented elsewhere in note.     Diet, Consistent Carbohydrate w/Evening Snack (03-28-23 @ 03:06) [Active]      Home Medications:  Albuterol (Eqv-ProAir HFA) 90 mcg/inh inhalation aerosol: 2 inhaled every 6 hours as needed for  shortness of breath and/or wheezing (28 Mar 2023 01:24)  allopurinol 300 mg oral tablet: 1 orally once a day (28 Mar 2023 01:16)  amLODIPine 10 mg oral tablet: 1 orally once a day (28 Mar 2023 01:19)  atorvastatin 80 mg oral tablet: 1 orally once a day (28 Mar 2023 01:21)  colchicine 0.6 mg oral tablet: 1 orally 2 times a day (28 Mar 2023 01:16)  D3 50 mcg (2000 intl units) oral capsule: 1 orally once a day (28 Mar 2023 01:28)  gabapentin 300 mg oral tablet: 1 tab(s) orally once a day (at bedtime) (28 Mar 2023 01:22)  glipiZIDE 5 mg oral tablet: 1 orally once a day (28 Mar 2023 01:17)  levothyroxine 25 mcg (0.025 mg) oral capsule: 1 cap(s) orally once a day (28 Mar 2023 01:23)  losartan 50 mg oral tablet: 1 orally once a day (28 Mar 2023 01:19)  MetFORMIN (Eqv-Fortamet) 1000 mg oral tablet, extended release: 1 orally once a day (at bedtime) (28 Mar 2023 01:17)  PARoxetine 30 mg oral tablet: 1 orally once a day (28 Mar 2023 01:25)  Zetia 10 mg oral tablet: 1 orally once a day (28 Mar 2023 01:21)    Social History:  Patient lives with his wife at home in [place], [place]. Retired .  of [employer]. Denies tobacco, alcohol, ilicit drug use. (28 Mar 2023 01:04)    FAMILY HISTORY:  Family history of stroke (Father)    Family history of heart attack (Mother)      MEDICATIONS  (STANDING):  acetaminophen     Tablet .. 1000 milliGRAM(s) Oral every 8 hours  amLODIPine   Tablet 10 milliGRAM(s) Oral daily  atorvastatin 80 milliGRAM(s) Oral at bedtime  enoxaparin Injectable 40 milliGRAM(s) SubCutaneous every 24 hours  gabapentin 300 milliGRAM(s) Oral every 8 hours  insulin lispro (ADMELOG) corrective regimen sliding scale   SubCutaneous Before meals and at bedtime  losartan 50 milliGRAM(s) Oral daily  methocarbamol 500 milliGRAM(s) Oral every 8 hours  multivitamin 1 Tablet(s) Oral daily  ondansetron   Disintegrating Tablet 4 milliGRAM(s) Oral every 6 hours  PARoxetine 30 milliGRAM(s) Oral daily  polyethylene glycol 3350 17 Gram(s) Oral daily  senna 2 Tablet(s) Oral at bedtime  sodium chloride 0.9%. 1000 milliLiter(s) (30 mL/Hr) IV Continuous <Continuous>    MEDICATIONS  (PRN):  albuterol    90 MICROgram(s) HFA Inhaler 2 Puff(s) Inhalation every 6 hours PRN for shortness of breath and/or wheezing  diphenhydrAMINE 50 milliGRAM(s) Oral at bedtime PRN Insomnia  ondansetron Injectable 4 milliGRAM(s) IV Push every 6 hours PRN Nausea and/or Vomiting  oxyCODONE    IR 5 milliGRAM(s) Oral every 4 hours PRN Severe Pain (7 - 10)            HLD (hyperlipidemia)      Gout      Anxiety      DM (diabetes mellitus)      Thoracic spinal stenosis      HTN (hypertension)      HTN (hypertension)      History of carpal tunnel surgery of left wrist      History of prostate surgery          Allergies    niacin (Rash (Mild))  Shrimp (Anaphylaxis)    Intolerances      Vital Signs Last 24 Hrs  T(C): 36.1 (31 Mar 2023 08:15), Max: 36.4 (30 Mar 2023 21:40)  T(F): 97 (31 Mar 2023 08:15), Max: 97.6 (30 Mar 2023 21:40)  HR: 71 (31 Mar 2023 10:20) (60 - 81)  BP: 142/65 (31 Mar 2023 10:20) (108/53 - 142/65)  BP(mean): 93 (31 Mar 2023 10:20) (75 - 93)  RR: 20 (31 Mar 2023 10:20) (1 - 23)  SpO2: 96% (31 Mar 2023 10:20) (91% - 100%)    Parameters below as of 31 Mar 2023 08:15  Patient On (Oxygen Delivery Method): room air            PHYSICAL EXAM:  Gen: Reclining in bed at time of exam, appears stated age  HEENT: NCAT, MMM, clear OP  Neck: supple, trachea at midline  CV: RRR, +S1/S2, b/l LE edema pitting + 1 to knees  Pulm: adequate respiratory effort, no increase in work of breathing  Abd: soft, NTND  Skin: warm and dry, no new rashes vs prior report  Ext: WWP, no LE edema  Neuro: AOx3, no gross focal neurological deficits  Psych: affect and behavior appropriate, pleasant at time of interview    LABS:                                   10.5   12.04 )-----------( 400      ( 31 Mar 2023 04:32 )             32.3   03-31    137  |  105  |  23  ----------------------------<  194<H>  4.7   |  24  |  0.98    Ca    8.1<L>      31 Mar 2023 04:32  Phos  4.3     03-31  Mg     1.8     03-31            MICRODATA:      RADIOLOGY/OTHER STUDIES:

## 2023-03-31 NOTE — DISCHARGE NOTE PROVIDER - CARE PROVIDERS DIRECT ADDRESSES
,DirectAddress_Unknown,[email or web address] ,DirectAddress_Unknown,[email or web address],DirectAddress_Unknown

## 2023-03-31 NOTE — PROGRESS NOTE ADULT - SUBJECTIVE AND OBJECTIVE BOX
NEUROSURGERY POST OP NOTE:    POD# 0 S/P T9-11 lami/fusion (3/30).       S: patient evaluated at bedside in PACU, no complaints of pain, vitals stable       T(C): 36.4 (03-30-23 @ 21:40), Max: 36.5 (03-30-23 @ 06:06)  HR: 61 (03-31-23 @ 00:00) (60 - 90)  BP: 120/56 (03-31-23 @ 00:00) (108/53 - 155/72)  RR: 12 (03-31-23 @ 00:00) (1 - 23)  SpO2: 95% (03-31-23 @ 00:00) (91% - 100%)      03-29-23 @ 07:01  -  03-30-23 @ 07:00  --------------------------------------------------------  IN: 0 mL / OUT: 600 mL / NET: -600 mL    03-30-23 @ 07:01  -  03-31-23 @ 01:28  --------------------------------------------------------  IN: 2095 mL / OUT: 1635 mL / NET: 460 mL        acetaminophen     Tablet .. 1000 milliGRAM(s) Oral every 8 hours  albuterol    90 MICROgram(s) HFA Inhaler 2 Puff(s) Inhalation every 6 hours PRN  amLODIPine   Tablet 10 milliGRAM(s) Oral daily  atorvastatin 80 milliGRAM(s) Oral at bedtime  ceFAZolin   IVPB 2000 milliGRAM(s) IV Intermittent every 8 hours  diphenhydrAMINE 50 milliGRAM(s) Oral at bedtime PRN  gabapentin 300 milliGRAM(s) Oral every 8 hours  insulin lispro (ADMELOG) corrective regimen sliding scale   SubCutaneous Before meals and at bedtime  methocarbamol 500 milliGRAM(s) Oral every 8 hours  multivitamin 1 Tablet(s) Oral daily  ondansetron   Disintegrating Tablet 4 milliGRAM(s) Oral every 6 hours  ondansetron Injectable 4 milliGRAM(s) IV Push every 6 hours PRN  oxyCODONE    IR 5 milliGRAM(s) Oral every 4 hours PRN  PARoxetine 30 milliGRAM(s) Oral daily  polyethylene glycol 3350 17 Gram(s) Oral daily  senna 2 Tablet(s) Oral at bedtime  sodium chloride 0.9%. 1000 milliLiter(s) IV Continuous <Continuous>      RADIOLOGY:     Exam:    WOUND/DRAINS:    DEVICES:       Assessment: 82yMale      Plan:      Assessment:  Present when checked    []  GCS  E   V  M     Heart Failure: []Acute, [] acute on chronic , []chronic  Heart Failure:  [] Diastolic (HFpEF), [] Systolic (HFrEF), []Combined (HFpEF and HFrEF), [] RHF, [] Pulm HTN, [] Other    [] JEMMA, [] ATN, [] AIN, [] other  [] CKD1, [] CKD2, [] CKD 3, [] CKD 4, [] CKD 5, []ESRD    Encephalopathy: [] Metabolic, [] Hepatic, [] toxic, [] Neurological, [] Other    Abnormal Nurtitional Status: [] malnurtition (see nutrition note), [ ]underweight: BMI < 19, [] morbid obesity: BMI >40, [] Cachexia    [] Sepsis  [] hypovolemic shock,[] cardiogenic shock, [] hemorrhagic shock, [] neuogenic shock  [] Acute Respiratory Failure  []Cerebral edema, [] Brain compression/ herniation,   [] Functional quadriplegia  [] Acute blood loss anemia       NEUROSURGERY POST OP NOTE:    POD# 1 S/P T9-11 lami/fusion (3/30).       S: patient evaluated at bedside in PACU, no complaints of pain, vitals stable       T(C): 36.4 (03-30-23 @ 21:40), Max: 36.5 (03-30-23 @ 06:06)  HR: 61 (03-31-23 @ 00:00) (60 - 90)  BP: 120/56 (03-31-23 @ 00:00) (108/53 - 155/72)  RR: 12 (03-31-23 @ 00:00) (1 - 23)  SpO2: 95% (03-31-23 @ 00:00) (91% - 100%)      03-29-23 @ 07:01  -  03-30-23 @ 07:00  --------------------------------------------------------  IN: 0 mL / OUT: 600 mL / NET: -600 mL    03-30-23 @ 07:01  -  03-31-23 @ 01:28  --------------------------------------------------------  IN: 2095 mL / OUT: 1635 mL / NET: 460 mL        acetaminophen     Tablet .. 1000 milliGRAM(s) Oral every 8 hours  albuterol    90 MICROgram(s) HFA Inhaler 2 Puff(s) Inhalation every 6 hours PRN  amLODIPine   Tablet 10 milliGRAM(s) Oral daily  atorvastatin 80 milliGRAM(s) Oral at bedtime  ceFAZolin   IVPB 2000 milliGRAM(s) IV Intermittent every 8 hours  diphenhydrAMINE 50 milliGRAM(s) Oral at bedtime PRN  gabapentin 300 milliGRAM(s) Oral every 8 hours  insulin lispro (ADMELOG) corrective regimen sliding scale   SubCutaneous Before meals and at bedtime  methocarbamol 500 milliGRAM(s) Oral every 8 hours  multivitamin 1 Tablet(s) Oral daily  ondansetron   Disintegrating Tablet 4 milliGRAM(s) Oral every 6 hours  ondansetron Injectable 4 milliGRAM(s) IV Push every 6 hours PRN  oxyCODONE    IR 5 milliGRAM(s) Oral every 4 hours PRN  PARoxetine 30 milliGRAM(s) Oral daily  polyethylene glycol 3350 17 Gram(s) Oral daily  senna 2 Tablet(s) Oral at bedtime  sodium chloride 0.9%. 1000 milliLiter(s) IV Continuous <Continuous>      RADIOLOGY:     Exam:  Constitutional: NAD, well groomed, well nourished  Respiratory: breathing non-labored, symmetrical chest wall movement  Cardiovascuar: RRR, no murmurs  Gastrointestinal: abdomen soft, non tender  Genitourinary: exam deffered  Neurological:  AAOX3. Face symmetric, Verbal function intact, speech clear  Cranial Nerves: II-XII intact  Motor: 5/5 power in b/l upper extremities, lower extremities anti-gravity, able to lift both legs off the plane of the bed (pain limited)  Sensation: intact to light touch in all extremities  Extremities: distal pulses 2+ x4  Wound/incision:  Drain: Posterior HMV x1 to self suction      Assessment: 83 y/o male with h/o DM, HTN, HLD, gout, and anxiety presents transfered with CT findings of multilevel thoracic canal stensis in the setting of LE weakness and bowel/bladder symptoms. Transfered for further management and surgical plan. Now, s/p T9-11 lami/fusion (3/30).         Plan:  NEURO  - Neuro checks q 4/vitals q 4   - MRI w/o contrast @ [facility] 3/25 Multilevel lower thoracic canal stenosis (T4 - [facility])  - Pain control   - PT/OT   - OOB with assistance   - HMV x 1  - Pending post op XR    CARDIO   - SBP    - HTN: Continue amlodipine, losartan held preop  - Echo @ [facility] 3/27 - moderate aortic valve stenosis, grade II diastolic dysfunction, mild LVH, EF 69%, rrepeat TTE signifcant for mod-to-severe aortic stenosis, grade I left ventricular diastolic dysfuction  - cardiology consulted, recs appreciated  - structural heart consulted, recs appreciated    PULM   - Maintain SpO2 > 92%  - Encourage IS     GI   - CCD  - Bowel regimen, last BM 3/28    RENAL   - NS at 30   - Urinary retenion: +bravo    ENDO/RHEUM  - ISS   - DM: A1c 6.3, hold home metformin, glipizide   - Gout: Hold home allopurinol, colchicine     HEME   - SCDs, SQL?  - LE dopplers 3/28 negative    ID   - No issues     DISPO   - Pending OR 3/30  - full code, regional status    D/W Dr. Vargas           Assessment:  Present when checked    []  GCS  E   V  M     Heart Failure: []Acute, [] acute on chronic , []chronic  Heart Failure:  [] Diastolic (HFpEF), [] Systolic (HFrEF), []Combined (HFpEF and HFrEF), [] RHF, [] Pulm HTN, [] Other    [] JEMMA, [] ATN, [] AIN, [] other  [] CKD1, [] CKD2, [] CKD 3, [] CKD 4, [] CKD 5, []ESRD    Encephalopathy: [] Metabolic, [] Hepatic, [] toxic, [] Neurological, [] Other    Abnormal Nurtitional Status: [] malnurtition (see nutrition note), [ ]underweight: BMI < 19, [] morbid obesity: BMI >40, [] Cachexia    [] Sepsis  [] hypovolemic shock,[] cardiogenic shock, [] hemorrhagic shock, [] neuogenic shock  [] Acute Respiratory Failure  []Cerebral edema, [] Brain compression/ herniation,   [] Functional quadriplegia  [] Acute blood loss anemia

## 2023-03-31 NOTE — CHART NOTE - NSCHARTNOTEFT_GEN_A_CORE
83 YO Male w/ PMHx of DM, HTN, HLD, Hypothyroidism, gout, and anxiety who was transferred from [facility] following CT findings of multilevel thoracic canal stenosis i/s/o worsening b/l LE weakness and bowel/bladder symptoms x 3 months. Patient was transferred to [facility] for further management and surgical plan. Cardiology consulted for pre-surgical clearance and found to have had TTE from [facility] on 3/27/23 which revealed normal EF 69%; GIIDD; elevated LA pressure; mild LVH; mild LA enlargement; moderate AS ( with peak velocity of 3.3; peak gradient 43 mmHG; mean gradient 43 mmHg; aortic valve area of 1 cm2); mild MR; PASP 34 mmHg. Structural heart team consulted for moderate to severe AS. Pt is now s/p T9-T11 lami/fusion with neurosurgery.     Plan:  Problem 1: Moderate to severe AS  -Case discussed with Dr. Bhat  -TTE from 3/30: Moderate-to-severe aortic stenosis. The peak transvalvular velocity is 3.90 m/s, the mean transvalvular gradient is 33.00 mmHg, and the LVOT/AV velocity ratio is 0.23. The aortic valve area is 0.73 cm². EF of 60-65%  -Pt has no complaints of SOB, dyspnea on exertion, lightheadedness, syncope, chest pain, palpitations or further associated symptoms both pre and post op with neurosurgery.   -Pt does not appear to be fluid overloaded, clear L/S and trace pedal edema.   -no intervention, pt to follow up and return as a planned admission for procedure.   -PT is to follow up as an out patient with Dr. Bhat or Dr. Blanca if symptoms begin/ worsen. Pt can go to the [place] office: [address] [facility], [facility]. Please call [phone] to make an appointment.   -Structural heart will sign off at this time, thank you for the consultation. Please re consult if needed.     Problem 2: Thoracic canal stenosis   -s/p T9-T11 lami/fusion with neurosurgery   -Care as per primary team    Problem 3: HTN  -Cont Norvasc  -Care per primary team    Problem 4: HLD  -Cont Lipitor   -Care per primary team    I have reviewed clinical labs tests and reports, radiology tests and reports, as well as old patient medical records, and discussed with the referring physician.

## 2023-03-31 NOTE — PHYSICAL THERAPY INITIAL EVALUATION ADULT - GENERAL OBSERVATIONS, REHAB EVAL
PT IE completed. Patient received seated in bedside chair +Tele, +R radial A-line, +bravo, +hemovac x1, +(B) SCDs, +wife present at bedside, patient NAD, willing to work with PT.

## 2023-03-31 NOTE — PHYSICAL THERAPY INITIAL EVALUATION ADULT - GAIT DEVIATIONS NOTED, PT EVAL
Demo fairly steady gait, however, demos decreased cuauhtemoc and impaired weight shifting ability, however, no LOB observed./decreased cuauhtemoc/increased time in double stance/decreased velocity of limb motion/decreased step length/decreased stride length/decreased weight-shifting ability

## 2023-03-31 NOTE — DISCHARGE NOTE PROVIDER - CARE PROVIDER_API CALL
Froilan Vargas)  Neurosurgery  [address], [address] [place] [address]  Phone: [phone]  Fax: [phone]  Follow Up Time:     Haydee Bautista)  Cardiovascular Disease; Internal Medicine  [address], [address]  [place] [address]  Phone: [phone]  Fax: [phone]  Follow Up Time:     Jonatan Blanca)  Cardiology; Interventional Cardiology  [address] [place] [address]  Phone: [phone]  Fax: [phone]  Follow Up Time:    Froilan Vargas)  Neurosurgery  [address], [address] [place] [address]  Phone: [phone]  Fax: [phone]  Follow Up Time:     Haydee Bautista)  Cardiovascular Disease; Internal Medicine  [address], [address]  [place] [address]  Phone: [phone]  Fax: [phone]  Follow Up Time:     Jonatan Blanca)  Cardiology; Interventional Cardiology  [address] [place] [address]  Phone: [phone]  Fax: [phone]  Follow Up Time:     Thompson Yang)  Anesthesiology; Pain Medicine  [address] [facility], [address]  [place] [address]  Phone: [phone]  Fax: [phone]  Follow Up Time:

## 2023-03-31 NOTE — DISCHARGE NOTE PROVIDER - YES NO FOR MLM POSITIVE OR NEGATIVE COVID RESULT
Anesthesia Evaluation     Patient summary reviewed and Nursing notes reviewed   NPO Solid Status: > 8 hours  NPO Liquid Status: > 4 hours           Airway   Mallampati: II  No difficulty expected and Possible difficult intubation  Dental - normal exam     Pulmonary     breath sounds clear to auscultation  (+) a smoker Former,   Cardiovascular     Rhythm: regular    (+) hypertension, dysrhythmias (RBBB and LAFB), hyperlipidemia,       Neuro/Psych  (+) numbness, psychiatric history Anxiety and Depression,       ROS Comment: Trigeminal neuralgia  GI/Hepatic/Renal/Endo    (+)   renal disease,     Musculoskeletal     Abdominal   (+) obese,    Substance History      OB/GYN          Other   arthritis,        Other Comment: Polymyalgia rheumatica                  Anesthesia Plan    ASA 3     general   (Adductor canal)  intravenous induction     Anesthetic plan, all risks, benefits, and alternatives have been provided, discussed and informed consent has been obtained with: patient.      
,

## 2023-03-31 NOTE — PROGRESS NOTE ADULT - ATTENDING COMMENTS
Initial attending contact date  3/29/23    . See fellow note written above for details. I reviewed the fellow documentation. I have personally seen and examined this patient. I reviewed vitals, labs, medications, cardiac studies, and additional imaging. I agree with the above fellow's findings and plans as written above with the following additions/statements.    81 y/o male with h/o DM, HTN, HLD, gout, and anxiety presents transferred with CT findings of multilevel thoracic canal stenosis in the setting of LE weakness and bowel/bladder symptoms. Transferred for further management and surgical plan. Neurosurgery consulted cardiology for pre-op risk assessment for laminectomy.    -Pt recently with limited functional capacity due to LE weakness, prior able to perform ~ 4 METS without symptoms  -EKG and ECHO findings as noted - with normal LVEF, mod-severe AS by report  -Pt considered int risk for int risk procedure. Given mod-severe AS, would recommend spinal anesthesia to mitigate cardiac risk perioperatively. If general anesthesia required, recommend cardiac anesthesia  -Pt now post op doing well  -CT structural consulted for possible TAVR in the future  -Pt to fu with Dr Hernandez for cardiology fu and CT stuctural team as outpatient

## 2023-03-31 NOTE — OCCUPATIONAL THERAPY INITIAL EVALUATION ADULT - DIAGNOSIS, OT EVAL
Patient POD #1 s/p T9-T11 lami/fusion, spinal precautions, presents with decreased overall balance, activity tolerance, postural control, impacting independence with functional activities and mobility.

## 2023-03-31 NOTE — OCCUPATIONAL THERAPY INITIAL EVALUATION ADULT - MODIFIED CLINICAL TEST OF SENSORY INTEGRATION IN BALANCE TEST
Patient functionally ambulated from bed<>bathroom with RW and Min A x 1. Patient noted with 1 LOB upon initial step after sit to stand requiring Min A x 1 to maintain balance. Patient with decreased weight shifting, postural control, narrow base of support requiring min verbal/tactile cues for proper positioning and safety throughout.

## 2023-03-31 NOTE — PHYSICAL THERAPY INITIAL EVALUATION ADULT - PERTINENT HX OF CURRENT PROBLEM, REHAB EVAL
81 y/o male with h/o DM, HTN, HLD, gout, and anxiety presents transfered with CT findings of multilevel thoracic canal stensis in the setting of LE weakness and bowel/bladder symptoms. Transfered for further management and surgical plan. Now, s/p T9-11 lami/fusion (3/30).

## 2023-03-31 NOTE — DISCHARGE NOTE PROVIDER - NSDCMRMEDTOKEN_GEN_ALL_CORE_FT
Albuterol (Eqv-ProAir HFA) 90 mcg/inh inhalation aerosol: 2 inhaled every 6 hours as needed for  shortness of breath and/or wheezing  allopurinol 300 mg oral tablet: 1 orally once a day  amLODIPine 10 mg oral tablet: 1 orally once a day  atorvastatin 80 mg oral tablet: 1 orally once a day  colchicine 0.6 mg oral tablet: 1 orally 2 times a day  D3 50 mcg (2000 intl units) oral capsule: 1 orally once a day  gabapentin 300 mg oral tablet: 1 tab(s) orally once a day (at bedtime)  glipiZIDE 5 mg oral tablet: 1 orally once a day  levothyroxine 25 mcg (0.025 mg) oral capsule: 1 cap(s) orally once a day  losartan 50 mg oral tablet: 1 orally once a day  MetFORMIN (Eqv-Fortamet) 1000 mg oral tablet, extended release: 1 orally once a day (at bedtime)  PARoxetine 30 mg oral tablet: 1 orally once a day  Zetia 10 mg oral tablet: 1 orally once a day   Albuterol (Eqv-ProAir HFA) 90 mcg/inh inhalation aerosol: 2 inhaled every 6 hours as needed for  shortness of breath and/or wheezing  allopurinol 300 mg oral tablet: 1 orally once a day  amLODIPine 10 mg oral tablet: 1 orally once a day  atorvastatin 80 mg oral tablet: 1 orally once a day  colchicine 0.6 mg oral tablet: 1 orally 2 times a day  D3 50 mcg (2000 intl units) oral capsule: 1 orally once a day  Flomax 0.4 mg oral capsule: 1 cap(s) orally once a day (at bedtime)  glipiZIDE 5 mg oral tablet: 1 orally once a day  Innerclean oral tablet: 2 tab(s) orally once a day (at bedtime) as needed for  constipation  levothyroxine 25 mcg (0.025 mg) oral capsule: 1 cap(s) orally once a day  losartan 50 mg oral tablet: 1 orally once a day  MetFORMIN (Eqv-Fortamet) 1000 mg oral tablet, extended release: 1 orally once a day (at bedtime)  methocarbamol 500 mg oral tablet: 1 tab(s) orally every 8 hours as needed for  muscle spasm  Neurontin 300 mg oral capsule: 1 cap(s) orally every 8 hours  PARoxetine 30 mg oral tablet: 1 orally once a day  Roxicodone 5 mg oral tablet: 1 tab(s) orally every 4 hours as needed for Severe Pain (7 - 10) MDD: 6  Zetia 10 mg oral tablet: 1 orally once a day

## 2023-04-01 NOTE — PROGRESS NOTE ADULT - SUBJECTIVE AND OBJECTIVE BOX
HPI:  83 y/o male with h/o DM, HTN, HLD, Hypothyroidism, gout, and anxiety presents transferred with CT findings of multilevel thoracic canal stenosis in the setting of LE weakness and bowel/bladder symptoms. Presented to [facility] on 2/25 after neurologist, Dr. Dailey recommended CT demonstrating concern for T10 compression. Presents with complaints of progressive worsening of b/l LE weakness and ambulatory disfunction x 3 months (previously ambulatory then requiring a cane, walker, and now unable to ambulate). Describes a tingling sensation in his toes but no loss of sensation in the lower extremities. Also reports dysuria, urinary retention, and saddle anesthesia over the last 2 weeks with intermittent bowel and bladder incontinence. Denies headache, chest pain, palpitations, visual abnormalities, nausea/vomiting, fever, chills, weight loss/gain. Transfered for further management and surgical plan.  (28 Mar 2023 01:04)    OVERNIGHT EVENTS: POD2. JOJO o/n. Neuro stable.     Hospital Course:  3/28: Transferred from [place] with spinal stenosis. Maciel placed for persistant urinary retention. Cardiology and medicine consulted for preop clearance. LE dopplers completed.   3/29: JOJO o/n. Anesthesia consulted preop. Losartan held preop.  3/30: JOJO o/n. Neuro stable. Preop for OR today. No preop lasix per anesthesiology. Started ppx SQL.   3/31: POD 1.  JOJO o/n. Pending post op imaging. Failed TOV. Straight cath prn.    Vital Signs Last 24 Hrs  T(C): 36.7 (31 Mar 2023 21:03), Max: 36.9 (31 Mar 2023 15:05)  T(F): 98.1 (31 Mar 2023 21:03), Max: 98.5 (31 Mar 2023 15:05)  HR: 75 (31 Mar 2023 21:03) (61 - 80)  BP: 111/75 (31 Mar 2023 21:03) (111/75 - 146/56)  BP(mean): 91 (31 Mar 2023 12:27) (72 - 93)  RR: 16 (31 Mar 2023 21:03) (11 - 21)  SpO2: 96% (31 Mar 2023 21:03) (94% - 100%)    Parameters below as of 31 Mar 2023 21:03  Patient On (Oxygen Delivery Method): room air        I&O's Summary    30 Mar 2023 07:01  -  31 Mar 2023 07:00  --------------------------------------------------------  IN: 2415 mL / OUT: 1915 mL / NET: 500 mL    31 Mar 2023 07:01  -  01 Apr 2023 01:44  --------------------------------------------------------  IN: 670 mL / OUT: 1020 mL / NET: -350 mL        PHYSICAL EXAM:  Constitutional: NAD, well groomed, well nourished  Respiratory: breathing non-labored, symmetrical chest wall movement  Cardiovascuar: RRR, no murmurs  Gastrointestinal: abdomen soft, non tender  Genitourinary: exam deffered  Neurological:  AAOX3. Face symmetric, Verbal function intact, speech clear  Cranial Nerves: II-XII intact  Motor: 5/5 power in b/l upper extremities, lower extremities anti-gravity, able to lift both legs off the plane of the bed (pain limited)  Sensation: intact to light touch in all extremities  Extremities: distal pulses 2+ x4  Wound/incision: posterior midline thoracic incision with dressing in place C/D/I.  Drain: Posterior HMV x1 to self suction    LABS:                        10.5   12.04 )-----------( 400      ( 31 Mar 2023 04:32 )             32.3     03-31    137  |  105  |  23  ----------------------------<  194<H>  4.7   |  24  |  0.98    Ca    8.1<L>      31 Mar 2023 04:32  Phos  4.3     03-31  Mg     1.8     03-31              CAPILLARY BLOOD GLUCOSE      POCT Blood Glucose.: 148 mg/dL (31 Mar 2023 23:39)  POCT Blood Glucose.: 182 mg/dL (31 Mar 2023 16:38)  POCT Blood Glucose.: 164 mg/dL (31 Mar 2023 11:15)  POCT Blood Glucose.: 204 mg/dL (31 Mar 2023 06:06)      Drug Levels: [] N/A    CSF Analysis: [] N/A      Allergies    niacin (Rash (Mild))  Shrimp (Anaphylaxis)    Intolerances      MEDICATIONS:  Antibiotics:    Neuro:  acetaminophen     Tablet .. 1000 milliGRAM(s) Oral every 8 hours  diphenhydrAMINE 50 milliGRAM(s) Oral at bedtime PRN  gabapentin 300 milliGRAM(s) Oral every 8 hours  methocarbamol 500 milliGRAM(s) Oral every 8 hours  ondansetron   Disintegrating Tablet 4 milliGRAM(s) Oral every 6 hours  ondansetron Injectable 4 milliGRAM(s) IV Push every 6 hours PRN  oxyCODONE    IR 5 milliGRAM(s) Oral every 4 hours PRN  PARoxetine 30 milliGRAM(s) Oral daily    Anticoagulation:  enoxaparin Injectable 40 milliGRAM(s) SubCutaneous every 24 hours    OTHER:  albuterol    90 MICROgram(s) HFA Inhaler 2 Puff(s) Inhalation every 6 hours PRN  amLODIPine   Tablet 10 milliGRAM(s) Oral daily  atorvastatin 80 milliGRAM(s) Oral at bedtime  insulin lispro (ADMELOG) corrective regimen sliding scale   SubCutaneous Before meals and at bedtime  losartan 50 milliGRAM(s) Oral daily  polyethylene glycol 3350 17 Gram(s) Oral daily  senna 2 Tablet(s) Oral at bedtime    IVF:  multivitamin 1 Tablet(s) Oral daily    CULTURES:    RADIOLOGY & ADDITIONAL TESTS:      ASSESSMENT:  83 y/o male with h/o DM, HTN, HLD, gout, and anxiety presents transfered with CT findings of multilevel thoracic canal stensis in the setting of LE weakness and bowel/bladder symptoms. Transfered for further management and surgical plan. Now, s/p T9-11 lami/fusion (3/30).     NEURO  - Neuro checks q 4/vitals q 4   - MRI w/o contrast @ [facility] 3/25 Multilevel lower thoracic canal stenosis (T4 - Conus)  - Pain control   - PT/OT   - OOB with assistance   - HMV x 1  - Pending post op XR    CARDIO   - SBP    - HTN: Continue amlodipine, losartan   - Echo @ [facility] 3/27 - moderate aortic valve stenosis, grade II diastolic dysfunction, mild LVH, EF 69%, repeat TTE 3/30 signifcant for mod-to-severe aortic stenosis, grade I left ventricular diastolic dysfuction  - cardiology consulted, recs appreciated  - structural heart consulted, recs appreciated    PULM   - Maintain SpO2 > 92%  - Encourage IS     GI   - CCD  - Bowel regimen, last BM 3/28    RENAL   - IVL  - Bladder scan q6, straight cath prn    ENDO/RHEUM  - ISS   - DM: A1c 6.3, hold home metformin, glipizide   - Gout: Hold home allopurinol, colchicine     HEME   - SCDs, SQL   - LE dopplers 3/28 negative    ID   - No issues     DISPO   - Home w/ Home PT/OT   - full code, regional status    D/W Dr. Vargas

## 2023-04-01 NOTE — CHART NOTE - NSCHARTNOTEFT_GEN_A_CORE
Patient failed TOV. Voiding 100-200cc with PVR ranging from 650-840cc. Requiring straight cath multiple times today. Urology consulted. Recommended stared Flomax 0.4mg at bedtime and to place bravo catheter if retaining on the next bladder scan. Discussed plan with patient and wife at bedside at length. However, patient is frustrating, amenable to flomax but adamantly refusing further straight cath or bravo catheter placement. Patient educated on risks of urinary retention at length. Patient failed TOV. Voiding 100-200cc with PVR ranging from 650-840cc. Requiring straight cath multiple times today. Urology consulted. Recommended starting Flomax 0.4mg at bedtime and to place bravo catheter if retaining on the next bladder scan. Discussed plan with patient and wife at bedside at length. However, patient is frustrated, amenable to flomax but adamantly refusing further straight cath or bravo catheter placement. Patient educated on risks of urinary retention at length. Patient failed TOV. Voiding 100-200cc with PVR ranging from 650-840cc throughout day shift. Requiring straight cath multiple times today. Urology consulted. Recommended starting Flomax 0.4mg at bedtime and to place bravo catheter if retaining > 300cc on the next bladder scan. Discussed plan with patient and wife at bedside at length. However, patient is frustrated, amenable to flomax but adamantly refusing further straight cath or bravo catheter placement. Patient educated on risks of urinary retention at length.

## 2023-04-01 NOTE — PROGRESS NOTE ADULT - PROBLEM SELECTOR PLAN 2
LE edema on exam  -Will d/w cardiology regarding diuresis Management as per primary team  -S/p OR on 3/30

## 2023-04-01 NOTE — PROGRESS NOTE ADULT - SUBJECTIVE AND OBJECTIVE BOX
Patient is a 82y old  Male who presents with a chief complaint of Spinal stenosis (28 Mar 2023 01:04)    INTERVAL EVENTS: JOJO    SUBJECTIVE:  Patient was seen and examined at bedside. Patient has no acute complaints. Feels well    Review of systems: Patient denies: fever, chills, dizziness, HA, Changes in vision, CP, dyspnea, nausea or vomiting, dysuria, changes in bowel movements, LE edema. Rest of 12 point Review of systems negative unless otherwise documented elsewhere in note.     Diet, Consistent Carbohydrate w/Evening Snack (03-28-23 @ 03:06) [Active]      Home Medications:  Albuterol (Eqv-ProAir HFA) 90 mcg/inh inhalation aerosol: 2 inhaled every 6 hours as needed for  shortness of breath and/or wheezing (28 Mar 2023 01:24)  allopurinol 300 mg oral tablet: 1 orally once a day (28 Mar 2023 01:16)  amLODIPine 10 mg oral tablet: 1 orally once a day (28 Mar 2023 01:19)  atorvastatin 80 mg oral tablet: 1 orally once a day (28 Mar 2023 01:21)  colchicine 0.6 mg oral tablet: 1 orally 2 times a day (28 Mar 2023 01:16)  D3 50 mcg (2000 intl units) oral capsule: 1 orally once a day (28 Mar 2023 01:28)  gabapentin 300 mg oral tablet: 1 tab(s) orally once a day (at bedtime) (28 Mar 2023 01:22)  glipiZIDE 5 mg oral tablet: 1 orally once a day (28 Mar 2023 01:17)  levothyroxine 25 mcg (0.025 mg) oral capsule: 1 cap(s) orally once a day (28 Mar 2023 01:23)  losartan 50 mg oral tablet: 1 orally once a day (28 Mar 2023 01:19)  MetFORMIN (Eqv-Fortamet) 1000 mg oral tablet, extended release: 1 orally once a day (at bedtime) (28 Mar 2023 01:17)  PARoxetine 30 mg oral tablet: 1 orally once a day (28 Mar 2023 01:25)  Zetia 10 mg oral tablet: 1 orally once a day (28 Mar 2023 01:21)    Social History:  Patient lives with his wife at home in [place], [place]. Retired .  of [employer]. Denies tobacco, alcohol, ilicit drug use. (28 Mar 2023 01:04)    FAMILY HISTORY:  Family history of stroke (Father)    Family history of heart attack (Mother)      MEDICATIONS  (STANDING):  acetaminophen     Tablet .. 1000 milliGRAM(s) Oral every 8 hours  amLODIPine   Tablet 10 milliGRAM(s) Oral daily  atorvastatin 80 milliGRAM(s) Oral at bedtime  enoxaparin Injectable 40 milliGRAM(s) SubCutaneous every 24 hours  gabapentin 300 milliGRAM(s) Oral every 8 hours  insulin lispro (ADMELOG) corrective regimen sliding scale   SubCutaneous Before meals and at bedtime  losartan 50 milliGRAM(s) Oral daily  methocarbamol 500 milliGRAM(s) Oral every 8 hours  multivitamin 1 Tablet(s) Oral daily  ondansetron   Disintegrating Tablet 4 milliGRAM(s) Oral every 6 hours  PARoxetine 30 milliGRAM(s) Oral daily  polyethylene glycol 3350 17 Gram(s) Oral daily  senna 2 Tablet(s) Oral at bedtime    MEDICATIONS  (PRN):  albuterol    90 MICROgram(s) HFA Inhaler 2 Puff(s) Inhalation every 6 hours PRN for shortness of breath and/or wheezing  diphenhydrAMINE 50 milliGRAM(s) Oral at bedtime PRN Insomnia  ondansetron Injectable 4 milliGRAM(s) IV Push every 6 hours PRN Nausea and/or Vomiting  oxyCODONE    IR 5 milliGRAM(s) Oral every 4 hours PRN Severe Pain (7 - 10)              HLD (hyperlipidemia)      Gout      Anxiety      DM (diabetes mellitus)      Thoracic spinal stenosis      HTN (hypertension)      HTN (hypertension)      History of carpal tunnel surgery of left wrist      History of prostate surgery          Allergies    niacin (Rash (Mild))  Shrimp (Anaphylaxis)    Intolerances      Vital Signs Last 24 Hrs  T(C): 36.4 (01 Apr 2023 08:45), Max: 36.9 (31 Mar 2023 15:05)  T(F): 97.5 (01 Apr 2023 08:45), Max: 98.5 (31 Mar 2023 15:05)  HR: 67 (01 Apr 2023 08:45) (61 - 80)  BP: 128/74 (01 Apr 2023 08:45) (111/75 - 146/56)  BP(mean): 92 (01 Apr 2023 08:45) (72 - 92)  RR: 17 (01 Apr 2023 08:45) (16 - 20)  SpO2: 96% (01 Apr 2023 08:45) (94% - 97%)    Parameters below as of 01 Apr 2023 08:45  Patient On (Oxygen Delivery Method): room air            PHYSICAL EXAM:  Gen: Reclining in bed at time of exam, appears stated age  HEENT: NCAT, MMM, clear OP  Neck: supple, trachea at midline  CV: RRR, +S1/S2, b/l LE edema pitting + 1 to knees  Pulm: adequate respiratory effort, no increase in work of breathing  Abd: soft, NTND  Skin: warm and dry, no new rashes vs prior report  Ext: WWP, no LE edema  Neuro: AOx3, no gross focal neurological deficits  Psych: affect and behavior appropriate, pleasant at time of interview    LABS:                                              10.2   12.05 )-----------( 426      ( 01 Apr 2023 06:59 )             31.7   04-01    133<L>  |  97  |  28<H>  ----------------------------<  184<H>  4.6   |  21<L>  |  1.26    Ca    8.4      01 Apr 2023 06:59  Phos  2.8     04-01  Mg     2.1     04-01                MICRODATA:      RADIOLOGY/OTHER STUDIES:

## 2023-04-01 NOTE — PROGRESS NOTE ADULT - PROBLEM SELECTOR PLAN 1
Management as per primary team  -S/p OR on 3/30 Patient reports history of BPH?  If continues to retain will need FC placement, start Tamsulosin and close f/u with urology

## 2023-04-02 NOTE — PROGRESS NOTE ADULT - REASON FOR ADMISSION
Spinal stenosis

## 2023-04-02 NOTE — PROGRESS NOTE ADULT - SUBJECTIVE AND OBJECTIVE BOX
HPI:  83 y/o male with h/o DM, HTN, HLD, Hypothyroidism, gout, and anxiety presents transferred with CT findings of multilevel thoracic canal stenosis in the setting of LE weakness and bowel/bladder symptoms. Presented to [facility] on 2/25 after neurologist, Dr. Dailey recommended CT demonstrating concern for T10 compression. Presents with complaints of progressive worsening of b/l LE weakness and ambulatory disfunction x 3 months (previously ambulatory then requiring a cane, walker, and now unable to ambulate). Describes a tingling sensation in his toes but no loss of sensation in the lower extremities. Also reports dysuria, urinary retention, and saddle anesthesia over the last 2 weeks with intermittent bowel and bladder incontinence. Denies headache, chest pain, palpitations, visual abnormalities, nausea/vomiting, fever, chills, weight loss/gain. Transfered for further management and surgical plan.  (28 Mar 2023 01:04)    OVERNIGHT EVENTS:  POD3. JOJO overnight. Neuro stable.     Hospital Course:  3/28: Transferred from [facility] with spinal stenosis. Brvao placed for persistant urinary retention. Cardiology and medicine consulted for preop clearance. LE dopplers completed.   3/29: JOJO o/n. Anesthesia consulted preop. Losartan held preop.  3/30: JOJO o/n. Neuro stable. Preop for OR today. No preop lasix per anesthesiology. Started ppx SQL.   3/31: POD 1.  JOJO o/n. Pending post op imaging. Failed TOV. Straight cath prn.  4/1: POD2. JOJO o/n. Neuro stable. Requiring multiple straight caths, with high PVR. Uroloogy consulted. Started flomax 0.4 at bedtime. Urology recommending bravo catheter placement if retaining on next bladder scan. Discussed plan with patient and wife at bedside.   4/2: POD3. JOJO overnight. Neuro stable.     Vital Signs Last 24 Hrs  T(C): 36.3 (01 Apr 2023 16:51), Max: 36.4 (01 Apr 2023 08:45)  T(F): 97.3 (01 Apr 2023 16:51), Max: 97.5 (01 Apr 2023 08:45)  HR: 87 (01 Apr 2023 16:51) (67 - 87)  BP: 147/64 (01 Apr 2023 16:51) (124/67 - 147/64)  BP(mean): 92 (01 Apr 2023 08:45) (92 - 92)  RR: 17 (01 Apr 2023 16:51) (17 - 18)  SpO2: 93% (01 Apr 2023 16:51) (93% - 96%)    Parameters below as of 01 Apr 2023 16:51  Patient On (Oxygen Delivery Method): room air        I&O's Summary    31 Mar 2023 07:01  -  01 Apr 2023 07:00  --------------------------------------------------------  IN: 670 mL / OUT: 1225 mL / NET: -555 mL    01 Apr 2023 07:01  -  02 Apr 2023 00:19  --------------------------------------------------------  IN: 300 mL / OUT: 3360 mL / NET: -3060 mL        PHYSICAL EXAM:  Constitutional: NAD, well groomed, well nourished  Respiratory: breathing non-labored, symmetrical chest wall movement  Cardiovascuar: RRR, no murmurs  Gastrointestinal: abdomen soft, non tender  Genitourinary: exam deffered  Neurological:  AAOX3. Face symmetric, Verbal function intact, speech clear  Cranial Nerves: II-XII intact  Motor: 5/5 power in b/l upper and lower extremities,   Sensation: intact to light touch in all extremities  Extremities: distal pulses 2+ x4  Wound/incision: posterior midline thoracic incision with dressing in place C/D/I.  Drain: Posterior HMV x1 to self suction    LABS:                        10.2   12.05 )-----------( 426      ( 01 Apr 2023 06:59 )             31.7     04-01    133<L>  |  97  |  28<H>  ----------------------------<  184<H>  4.6   |  21<L>  |  1.26    Ca    8.4      01 Apr 2023 06:59  Phos  2.8     04-01  Mg     2.1     04-01              CAPILLARY BLOOD GLUCOSE      POCT Blood Glucose.: 270 mg/dL (01 Apr 2023 22:38)  POCT Blood Glucose.: 160 mg/dL (01 Apr 2023 16:57)  POCT Blood Glucose.: 166 mg/dL (01 Apr 2023 11:45)  POCT Blood Glucose.: 172 mg/dL (01 Apr 2023 06:04)      Drug Levels: [] N/A    CSF Analysis: [] N/A      Allergies    niacin (Rash (Mild))  Shrimp (Anaphylaxis)    Intolerances      MEDICATIONS:  Antibiotics:    Neuro:  acetaminophen     Tablet .. 1000 milliGRAM(s) Oral every 8 hours  diphenhydrAMINE 50 milliGRAM(s) Oral at bedtime PRN  gabapentin 300 milliGRAM(s) Oral every 8 hours  methocarbamol 500 milliGRAM(s) Oral every 8 hours  ondansetron   Disintegrating Tablet 4 milliGRAM(s) Oral every 6 hours  ondansetron Injectable 4 milliGRAM(s) IV Push every 6 hours PRN  oxyCODONE    IR 5 milliGRAM(s) Oral every 4 hours PRN  PARoxetine 30 milliGRAM(s) Oral daily    Anticoagulation:  enoxaparin Injectable 40 milliGRAM(s) SubCutaneous every 24 hours    OTHER:  albuterol    90 MICROgram(s) HFA Inhaler 2 Puff(s) Inhalation every 6 hours PRN  amLODIPine   Tablet 10 milliGRAM(s) Oral daily  atorvastatin 80 milliGRAM(s) Oral at bedtime  insulin lispro (ADMELOG) corrective regimen sliding scale   SubCutaneous Before meals and at bedtime  losartan 50 milliGRAM(s) Oral daily  polyethylene glycol 3350 17 Gram(s) Oral daily  senna 2 Tablet(s) Oral at bedtime  tamsulosin 0.4 milliGRAM(s) Oral at bedtime    IVF:  multivitamin 1 Tablet(s) Oral daily    CULTURES:    RADIOLOGY & ADDITIONAL TESTS:      ASSESSMENT:  83 y/o male with h/o DM, HTN, HLD, gout, and anxiety presents transfered with CT findings of multilevel thoracic canal stensis in the setting of LE weakness and bowel/bladder symptoms. Transfered for further management and surgical plan. Now, s/p T9-11 lami/fusion (3/30).     NEURO  - Neuro checks q 4/vitals q 4   - MRI w/o contrast @ [facility] 3/25 Multilevel lower thoracic canal stenosis (T4 - Conus)  - Pain control   - PT/OT   - OOB with assistance   - HMV x 1  - Pending post op XR    CARDIO   - SBP    - HTN: Continue amlodipine, losartan   - Echo @ [facility] 3/27 - moderate aortic valve stenosis, grade II diastolic dysfunction, mild LVH, EF 69%, repeat TTE 3/30 signifcant for mod-to-severe aortic stenosis, grade I left ventricular diastolic dysfuction  - cardiology consulted, recs appreciated  - structural heart consulted, recs appreciated    PULM   - Maintain SpO2 > 92%  - Encourage IS     GI   - CCD  - Bowel regimen, last BM 3/28    RENAL   - IVL  - Flomax 0.4mg at bedtime for urinary retention started 4/1  - Bladder scan q6, straight cath prn    ENDO/RHEUM  - ISS   - DM: A1c 6.3, hold home metformin, glipizide   - Gout: Hold home allopurinol, colchicine     HEME   - SCDs, SQL   - LE dopplers 3/28 negative    ID   - No issues     DISPO   - Home w/ Home PT/OT   - full code, regional status    D/W Dr. Vargas

## 2023-04-02 NOTE — PROGRESS NOTE ADULT - SUBJECTIVE AND OBJECTIVE BOX
Patient is a 82y old  Male who presents with a chief complaint of Spinal stenosis (28 Mar 2023 01:04)    INTERVAL EVENTS: FC placed, Urology called    SUBJECTIVE:  Patient was seen and examined at bedside. Patient has no acute complaints. Feels well    Review of systems: Patient denies: fever, chills, dizziness, HA, Changes in vision, CP, dyspnea, nausea or vomiting, dysuria, changes in bowel movements, LE edema. Rest of 12 point Review of systems negative unless otherwise documented elsewhere in note.     Diet, Consistent Carbohydrate w/Evening Snack (03-28-23 @ 03:06) [Active]      Home Medications:  Albuterol (Eqv-ProAir HFA) 90 mcg/inh inhalation aerosol: 2 inhaled every 6 hours as needed for  shortness of breath and/or wheezing (28 Mar 2023 01:24)  allopurinol 300 mg oral tablet: 1 orally once a day (28 Mar 2023 01:16)  amLODIPine 10 mg oral tablet: 1 orally once a day (28 Mar 2023 01:19)  atorvastatin 80 mg oral tablet: 1 orally once a day (28 Mar 2023 01:21)  colchicine 0.6 mg oral tablet: 1 orally 2 times a day (28 Mar 2023 01:16)  D3 50 mcg (2000 intl units) oral capsule: 1 orally once a day (28 Mar 2023 01:28)  gabapentin 300 mg oral tablet: 1 tab(s) orally once a day (at bedtime) (28 Mar 2023 01:22)  glipiZIDE 5 mg oral tablet: 1 orally once a day (28 Mar 2023 01:17)  levothyroxine 25 mcg (0.025 mg) oral capsule: 1 cap(s) orally once a day (28 Mar 2023 01:23)  losartan 50 mg oral tablet: 1 orally once a day (28 Mar 2023 01:19)  MetFORMIN (Eqv-Fortamet) 1000 mg oral tablet, extended release: 1 orally once a day (at bedtime) (28 Mar 2023 01:17)  PARoxetine 30 mg oral tablet: 1 orally once a day (28 Mar 2023 01:25)  Zetia 10 mg oral tablet: 1 orally once a day (28 Mar 2023 01:21)    Social History:  Patient lives with his wife at home in [place], [place]. Retired . [employer]. Denies tobacco, alcohol, ilicit drug use. (28 Mar 2023 01:04)    FAMILY HISTORY:  Family history of stroke (Father)    Family history of heart attack (Mother)      MEDICATIONS  (STANDING):  acetaminophen     Tablet .. 1000 milliGRAM(s) Oral every 8 hours  amLODIPine   Tablet 10 milliGRAM(s) Oral daily  atorvastatin 80 milliGRAM(s) Oral at bedtime  enoxaparin Injectable 40 milliGRAM(s) SubCutaneous every 24 hours  gabapentin 300 milliGRAM(s) Oral every 8 hours  insulin lispro (ADMELOG) corrective regimen sliding scale   SubCutaneous Before meals and at bedtime  losartan 50 milliGRAM(s) Oral daily  methocarbamol 500 milliGRAM(s) Oral every 8 hours  multivitamin 1 Tablet(s) Oral daily  ondansetron   Disintegrating Tablet 4 milliGRAM(s) Oral every 6 hours  PARoxetine 30 milliGRAM(s) Oral daily  polyethylene glycol 3350 17 Gram(s) Oral daily  senna 2 Tablet(s) Oral at bedtime  tamsulosin 0.4 milliGRAM(s) Oral at bedtime    MEDICATIONS  (PRN):  albuterol    90 MICROgram(s) HFA Inhaler 2 Puff(s) Inhalation every 6 hours PRN for shortness of breath and/or wheezing  diphenhydrAMINE 50 milliGRAM(s) Oral at bedtime PRN Insomnia  ondansetron Injectable 4 milliGRAM(s) IV Push every 6 hours PRN Nausea and/or Vomiting  oxyCODONE    IR 5 milliGRAM(s) Oral every 4 hours PRN Severe Pain (7 - 10)        HLD (hyperlipidemia)      Gout      Anxiety      DM (diabetes mellitus)      Thoracic spinal stenosis      HTN (hypertension)      HTN (hypertension)      History of carpal tunnel surgery of left wrist      History of prostate surgery          Allergies    niacin (Rash (Mild))  Shrimp (Anaphylaxis)    Intolerances    Vital Signs Last 24 Hrs  T(C): 36.6 (02 Apr 2023 08:53), Max: 37 (02 Apr 2023 04:05)  T(F): 97.8 (02 Apr 2023 08:53), Max: 98.6 (02 Apr 2023 04:05)  HR: 79 (02 Apr 2023 08:53) (79 - 91)  BP: 131/67 (02 Apr 2023 08:53) (131/67 - 147/64)  BP(mean): 88 (02 Apr 2023 08:53) (88 - 88)  RR: 16 (02 Apr 2023 08:53) (16 - 17)  SpO2: 98% (02 Apr 2023 08:53) (91% - 98%)    Parameters below as of 02 Apr 2023 08:53  Patient On (Oxygen Delivery Method): room air            PHYSICAL EXAM:  Gen: Reclining in bed at time of exam, appears stated age  HEENT: NCAT, MMM, clear OP  Neck: supple, trachea at midline  CV: RRR, +S1/S2, b/l LE edema pitting + 1 to knees  Pulm: adequate respiratory effort, no increase in work of breathing  Abd: soft, NTND  Skin: warm and dry, no new rashes vs prior report  Ext: WWP, no LE edema  Neuro: AOx3, no gross focal neurological deficits  Psych: affect and behavior appropriate, pleasant at time of interview    LABS:                                              10.5   12.11 )-----------( 456      ( 02 Apr 2023 07:55 )             33.5   04-02    136  |  102  |  25<H>  ----------------------------<  125<H>  5.3   |  26  |  1.13    Ca    8.7      02 Apr 2023 07:55  Phos  3.1     04-02  Mg     2.4     04-02                  MICRODATA:      RADIOLOGY/OTHER STUDIES:

## 2023-04-02 NOTE — PROGRESS NOTE ADULT - SUBJECTIVE AND OBJECTIVE BOX
83 yo male seen by urology for failed Trial of Void.  Now for discharge.  Patient has now failed the 3rd time.  Recent output 250cc but PVR ~ 1liter as reported by Ortho.  Maciel to be replaced by primary team and patient will be discharge home today with catheter and instructed to follow up in the urology resident clinic.

## 2023-04-02 NOTE — DISCHARGE NOTE NURSING/CASE MANAGEMENT/SOCIAL WORK - NSDCFUADDAPPT_GEN_ALL_CORE_FT
Please follow up with Dr. Stanley on 4/13/23. Call the office to confirm appointment date/time    Follow up with your primary care doctor

## 2023-04-02 NOTE — DISCHARGE NOTE NURSING/CASE MANAGEMENT/SOCIAL WORK - NSDCPEFALRISK_GEN_ALL_CORE
For information on Fall & Injury Prevention, visit: [email or web address] OR  [email or web address] OR  https://www.cdc.gov/steadi/patient.html

## 2023-04-02 NOTE — PROGRESS NOTE ADULT - PROBLEM/PLAN-2
DISPLAY PLAN FREE TEXT
Never
DISPLAY PLAN FREE TEXT

## 2023-04-02 NOTE — DISCHARGE NOTE NURSING/CASE MANAGEMENT/SOCIAL WORK - PATIENT PORTAL LINK FT
You can access the FollowMyHealth Patient Portal offered by [facility] by registering at the following website: [email or web address]. By joining [employer]’s FollowMyHealth portal, you will also be able to view your health information using other applications (apps) compatible with our system.

## 2023-04-02 NOTE — PROGRESS NOTE ADULT - PROVIDER SPECIALTY LIST ADULT
Neurosurgery
Neurosurgery
Pain Medicine
Urology
Cardiology
Cardiology
Neurosurgery
Hospitalist

## 2023-04-02 NOTE — PROGRESS NOTE ADULT - ASSESSMENT
81 y/o male with h/o DM, HTN, HLD, gout, and anxiety presents transfered with CT findings of multilevel thoracic canal stensis in the setting of LE weakness and bowel/bladder symptoms. Transfered for further management and surgical plan. 
81 y/o male with h/o DM, HTN, HLD, gout, and anxiety presents transfered with CT findings of multilevel thoracic canal stensis in the setting of LE weakness and bowel/bladder symptoms. Transfered for further management and surgical plan. Neurosurgery consulted cardiology for pre-op risk assessment for laminectomy.      Review of systems:   EKG: NST, incomplete RBBB; LVH ( form march 24th 2023)  ECHO form [place] (3/27/23): nromal EF 69%; GIIDD; elevated LA pressure; mild LVH; mild LA enlargement; moderate AS ( with peak velocity of 3.3; peak gradient 43 mmHG; mean gradient 43 mmHg; aortic valve area of 1 cm2); mild MR; PASP 34 m,Hg.    # post op check   Patient was seen in the pacu. Denies any complains.    Home medications: zetia 10 mg po daily, amlodipine 10 mg dialy; losartan 50 mg daily.   Patient appears euvolemic on physical exam, please add a BNP for baseline. Trace LE swelling can be in a setting of lower mobility or amlodipine  HTN: c/w Amlodipine 10 mg daily; c/w Losartan   HLD: c/w atorvastatin 80 mg daily    #mod-to severe AS  upon d/c patient should follow up with structural cardiology and cardiology     Please reconsult cardiology as needed     
81 y/o male with h/o DM, HTN, HLD, gout, and anxiety presents transfered with CT findings of multilevel thoracic canal stensis in the setting of LE weakness and bowel/bladder symptoms. Transfered for further management and surgical plan. Neurosurgery consulted cardiology for pre-op risk assessment for laminectomy.      Review of systems:   EKG: NST, incomplete RBBB; LVH ( form march 24th 2023)  ECHO form [place] (3/27/23): nromal EF 69%; GIIDD; elevated LA pressure; mild LVH; mild LA enlargement; moderate AS ( with peak velocity of 3.3; peak gradient 43 mmHG; mean gradient 43 mmHg; aortic valve area of 1 cm2); mild MR; PASP 34 m,Hg.    # pre-op risk assessment  Patient reports that for the past 3 months his ET has been limited in a setting of LE weakness. Prior to this patient reports no limitations. He also reports that in this setting his ankles would swell up occasionally. Patient also noted he is able to walk 2 flights of stairs prior. He also had a hand surgery about a year ago with general anesthesia w/o issues. Patient denies smoking, does smoke an occasional cigar.   Home medications: zetia 10 mg po daily, amlodipine 10 mg dialy; losartan 50 mg daily.   Patient appears euvolemic on physical exam, please add a BNP for baseline. Trace LE swelling can be in a setting of lower mobility or amlodipine  METS around 4   RCRI: zero point, 3.9% risk of 30 day of death, MI, or cardiac arrest   Patient is with at least moderate AS which outs him at higher risk, despite having lower RCRI. Recommend local anesthesia if possible (epidural), avoid hypertension/hypotension and excessive fluids.  If going to use general anesthesia, please utilize cardiac anesthesia.  HTN: c/w Amlodipine 10 mg daily; can hold Losartan on the day of the surgery, restart post operatively  HLD: c/w atorvastatin 80 mg daily  Patient is intermediate risk for an intermediate risk procedure laminectomy     #mod-to severe AS  Recommend structural cardiology team consult to start a follow up for AS   can obtain a non-urgent echo 
81 y/o male with h/o DM, HTN, HLD, gout, and anxiety presents transfered with CT findings of multilevel thoracic canal stensis in the setting of LE weakness and bowel/bladder symptoms. Transfered for further management and surgical plan. 

## 2023-04-13 PROBLEM — Z87.39 HISTORY OF GOUT: Status: RESOLVED | Noted: 2023-01-01 | Resolved: 2023-01-01

## 2023-04-13 PROBLEM — Z09 POSTOP CHECK: Status: ACTIVE | Noted: 2023-01-01

## 2023-04-13 PROBLEM — F41.9 ANXIETY AND DEPRESSION: Status: RESOLVED | Noted: 2023-01-01 | Resolved: 2023-01-01

## 2023-04-13 PROBLEM — Z51.89 VISIT FOR WOUND CHECK: Status: ACTIVE | Noted: 2023-01-01

## 2023-04-13 PROBLEM — Z86.39 HISTORY OF HYPERLIPIDEMIA: Status: RESOLVED | Noted: 2023-01-01 | Resolved: 2023-01-01

## 2023-04-13 PROBLEM — Z82.49 FAMILY HISTORY OF HEART ATTACK: Status: ACTIVE | Noted: 2023-01-01

## 2023-04-13 PROBLEM — Z86.39 HISTORY OF DIABETES MELLITUS: Status: RESOLVED | Noted: 2023-01-01 | Resolved: 2023-01-01

## 2023-04-13 PROBLEM — R29.898 WEAKNESS OF BOTH LOWER EXTREMITIES: Status: ACTIVE | Noted: 2023-01-01

## 2023-04-14 PROBLEM — M48.04: Status: ACTIVE | Noted: 2023-01-01

## 2023-04-14 NOTE — PHYSICAL EXAM
[General Appearance - Alert] : alert [General Appearance - Well Nourished] : well nourished [General Appearance - Well-Appearing] : healthy appearing [Longitudinal] : longitudinal [Clean] : clean [Well-Healed] : well-healed [No Drainage] : without drainage [Normal Skin] : normal [Oriented To Time, Place, And Person] : oriented to person, place, and time [Person] : oriented to person [Place] : oriented to place [Time] : oriented to time [5] : L3 quadriceps 5/5 [1] : S1 toe walking 1/5 [No Visual Abnormalities] : no visible abnormailities [Neck Appearance] : the appearance of the neck was normal [] : no respiratory distress [No Spinal Tenderness] : no spinal tenderness [Ataxic] : ataxic [FreeTextEntry1] : mid back [FreeTextEntry8] : patient wheelchair [Able to toe walk] : the patient was not able to toe walk [Able to heel walk] : the patient was not able to heel walk

## 2023-04-14 NOTE — DATA REVIEWED
[de-identified] : ACC: [id number] EXAM: XR T SPINE 2 VIEWS ORDERED BY: IVONE KOROMA\par \par PROCEDURE DATE: 04/02/2023\par \par \par \par INTERPRETATION: INDICATION: Post operative study. Thoracic fusion.\par \par TECHNIQUE: 2 views of the thoracic spine.\par \par COMPARISON: None\par \par CORRELATION: CT thoracic spine 3/24/2023.\par \par FINDINGS: The patient is status post posterior fusion with vertical rods and transpedicular screws at the T9 through the T11 level. A interconnecting anna is also present at T10. The surgical hardware is intact. The patient is also status post laminectomy at these levels. There is soft tissue swelling and which is likely postoperative.\par \par Atherosclerotic changes of the aorta.\par \par IMPRESSION: Status post interval T9-11 posterior fusion and decompression with postsurgical changes.\par \par --- End of Report ---\par \par \par \par \par CEDRIC GOMEZ MD; Resident Radiologist\par This document has been electronically signed.\par STEPHON REYNAGA MD; Attending Radiologist\par This document has been electronically signed. Apr 3 2023 1:08PM

## 2023-04-14 NOTE — REASON FOR VISIT
[de-identified] : 1.  Laminectomy with bilateral medial facetectomies and foraminotomies,  T9-10 and T10-11.\par 2.  Posterior spinal fusion, T9T10 and T10-11.\par 3.  Posterior segmental instrumentation, T9, T10, T11.\par 4.  Use of local autograft bone  and allograft bone to facilitate fusion.\par \par  [de-identified] : 03/30/2023 [de-identified] : \par \par Surgical intervention was c/b urinary retention requiring placement of an indwelling catheter.\par Reports doing well but thinks he may be more depression (on Paxil)  d/t still having the cath in place and difficulty walking (preop complaint)\par \par He is yet to see the urologist  " the trip to the [facility] was too much" He has an appt scheduled in [place] on 4/20/2023 with Dr Seals ()\par \par Denies any signs of postop wound infection which could include but is not limited to redness/swelling/purulent drainage\par Denies CP/SOB/unilateral leg edema\par He is slowly introducing preop activities but this is difficult and needs at least one person assist. His wife helps with most ADLs\par \par Continues inhome PT 1x/week which he doesn’t think is offering much to his recovery\par \par Denies the need for oral pain medication.\par He is w/o any new neurological concerns today.\par

## 2023-04-14 NOTE — REVIEW OF SYSTEMS
[Leg Weakness] : leg weakness [Inability to Walk] : inability to walk [Negative] : Musculoskeletal [FreeTextEntry8] : bravo inplace

## 2023-04-14 NOTE — ASSESSMENT
[FreeTextEntry1] : \par \par Refrain from bending/twisting/lifting\par Wound care instructions:\par okay to get incision wet wash with soap and water gently\par \par avoid sun exposure to the incision site for at least 3- 6 months\par No public pools/tub baths x 6-8 weeks\par Continue to increase activity as tolerated will refer to outpatient PT for  muscular strengthening and gait disturbance\par I have discussed red-flag symptom s in detail and he understands the importance of seeking medical attention if these do occur\par \par I educated regarding the importance of PCP/urology/ psych f/u\par An understanding was verbalized\par Continued f/u in the office in 3 weeks (patient is requesting a virtual visit d/t travel XRAY script given he will complete and send CD to office prior to his next visit 5/10/23)\par Bone stimulator to facilitate postop healing\par \par Re-educated regarding the continued needed for proper body mechanics\par Patient was advised to continue following up with primary care MD for any other medical conditions or concerns\par \par Patient reminded the he will need to PREMEDICATED prior to all dental procedures.\par NO NSAIDs for 6 weeks postop\par \par

## 2023-05-10 NOTE — REASON FOR VISIT
[de-identified] : 1.  Laminectomy with bilateral medial facetectomies and foraminotomies,  T9-10 and T10-11.\par 2.  Posterior spinal fusion, T9T10 and T10-11.\par 3.  Posterior segmental instrumentation, T9, T10, T11.\par 4.  Use of local autograft bone  and allograft bone to facilitate fusion.\par \par  [de-identified] : 03/30/2023 [de-identified] : Visit Date 4/13/2023\par Surgical intervention was c/b urinary retention requiring placement of an indwelling catheter.\par Reports doing well but thinks he may be more depression (on Paxil)  d/t still having the cath in place and difficulty walking (preop complaint)\par \par He is yet to see the urologist  " the trip to the [facility] [place] was too much" He has an appt scheduled in [place] on 4/20/2023 with Dr Seals ()\par \par Denies any signs of postop wound infection which could include but is not limited to redness/swelling/purulent drainage\par Denies CP/SOB/unilateral leg edema\par He is slowly introducing preop activities but this is difficult and needs at least one person assist. His wife helps with most ADLs\par \par Continues inhome PT 1x/week which he doesn’t think is offering much to his recovery\par \par Denies the need for oral pain medication.\par He is w/o any new neurological concerns today.\par

## 2023-05-10 NOTE — HISTORY OF PRESENT ILLNESS
[Home] : at home, [unfilled] , at the time of the visit. [Medical Office: ([facility])___] : at the medical office located in  [Verbal consent obtained from patient] : the patient, [unfilled] [FreeTextEntry1] : TODAY:\par Reports doing well\par Denies any signs of postop wound infection which could include but is not limited to redness/swelling/purulent drainage\par Denies CP/SOB/unilateral leg edema\par He is slowly introducing preop activities\par Reports continued use of oral pain medications but his requirement has decreased over time\par \par He offers no new symptoms/concerns today \par \par Recently saw Psychiatrist for medications optimization\par \par Urology follow for TOV/removal of bravo\par \par XRAY T spine for review, r/o hardware compromise

## 2023-09-12 NOTE — PROGRESS NOTE ADULT - PROBLEM SELECTOR PROBLEM 8
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

## (undated) DEVICE — STRYKER SONOPET IQ TIP 10CM BROAD KNIFE

## (undated) DEVICE — SPONGE PEANUT AUTO COUNT

## (undated) DEVICE — DRSG DERMABOND 0.7ML

## (undated) DEVICE — DRAPE FOR 2 TIER TABLE W/ 8" BACK 72" LONG

## (undated) DEVICE — ELCTR STRYKER NEPTUNE SMOKE EVACUATION PENCIL (GREEN)

## (undated) DEVICE — GLV 7.5 PROTEXIS (WHITE)

## (undated) DEVICE — Device

## (undated) DEVICE — DRAPE INSTRUMENT POUCH 6.75" X 11"

## (undated) DEVICE — NDL HYPO SAFE 22G X 1.5" (BLACK)

## (undated) DEVICE — STAPLER SKIN PROXIMATE

## (undated) DEVICE — MIDAS REX MR8 MATCH HEAD FLUTED LG BORE 3MM X 14CM

## (undated) DEVICE — SUT VICRYL 0 27" UR-6

## (undated) DEVICE — ELCTR BOVIE TIP BLADE INSULATED 2.75" EDGE

## (undated) DEVICE — FOLEY TRAY 16FR 5CC LF UMETER CLOSED

## (undated) DEVICE — SUT ETHILON 3-0 18" PS-1

## (undated) DEVICE — DRAPE LIGHT HANDLE COVER (GREEN)

## (undated) DEVICE — PACK SPINE

## (undated) DEVICE — DRSG TELFA 3 X 8

## (undated) DEVICE — MIDAS REX MR8 BALL FLUTED SM BORE 5MM X 10CM

## (undated) DEVICE — DRAPE TOP SHEET 53" X 101"

## (undated) DEVICE — WOUND IRR SURGIPHOR

## (undated) DEVICE — ELCTR BOVIE TIP BLADE INSULATED 4" EDGE

## (undated) DEVICE — STRYKER INSTRUMENT BATTERY

## (undated) DEVICE — TUBING SMOKE EVAC 3/8" X 10FT FOR NEPTUNE

## (undated) DEVICE — GLV 8 PROTEXIS ORTHO (CREAM)

## (undated) DEVICE — STRYKER SONOPET IQ TUBING SET

## (undated) DEVICE — SPONGE SURGICAL STRIP 1/2 X 6"

## (undated) DEVICE — DRAPE MICROSCOPE LEICA 26" X 150"

## (undated) DEVICE — DRAPE 1/2 SHEET 40X57"

## (undated) DEVICE — MARKING PEN W RULER

## (undated) DEVICE — DRAPE IOBAN 33" X 23"

## (undated) DEVICE — DRAIN JACKSON PRATT 3 SPRING RESERVOIR W 10FR PVC DRAIN

## (undated) DEVICE — DRSG TEGADERM 4X4.75"

## (undated) DEVICE — SUT VICRYL PLUS 2-0 18" CT-2 (POP-OFF)

## (undated) DEVICE — SUT VICRYL PLUS 0 36" CT-1

## (undated) DEVICE — GLV 8 PROTEXIS (WHITE)

## (undated) DEVICE — DRAPE 3/4 SHEET 52X76"

## (undated) DEVICE — DRSG DERMABOND PRINEO 22CM

## (undated) DEVICE — SUT MONOCRYL 3-0 18" PS-2 UNDYED

## (undated) DEVICE — POLISHER OR CAUTERY TIP

## (undated) DEVICE — FEEDING TUBE ENTERAL KANGAROO CONNECT ENPLUS SPIKE SET

## (undated) DEVICE — PREP CHLORAPREP HI-LITE ORANGE 26ML

## (undated) DEVICE — MIDAS REX MR8 MATCH HEAD FLUTED SM BORE 3MM X 10CM

## (undated) DEVICE — STRYKER BONE MILL BLADE FINE 3.2MM

## (undated) DEVICE — ELCTR AQUAMANTYS BIPOLAR SEALER 6.0

## (undated) DEVICE — SUT MONOCRYL 4-0 27" PS-2 UNDYED

## (undated) DEVICE — NDL SPINAL 18G X 3.5" (PINK)

## (undated) DEVICE — SPONGE SURGICAL STRIP 1/4 X 6"

## (undated) DEVICE — DRAPE GENERAL ENDOSCOPY